# Patient Record
Sex: FEMALE | Race: WHITE | Employment: PART TIME | URBAN - METROPOLITAN AREA
[De-identification: names, ages, dates, MRNs, and addresses within clinical notes are randomized per-mention and may not be internally consistent; named-entity substitution may affect disease eponyms.]

---

## 2017-01-06 ENCOUNTER — OFFICE VISIT (OUTPATIENT)
Dept: OPTOMETRY | Facility: CLINIC | Age: 22
End: 2017-01-06

## 2017-01-06 DIAGNOSIS — H52.203 ASTIGMATISM, BILATERAL: Primary | ICD-10-CM

## 2017-01-06 PROCEDURE — 92012 INTRM OPH EXAM EST PATIENT: CPT | Performed by: OPTOMETRIST

## 2017-01-06 PROCEDURE — 92015 DETERMINE REFRACTIVE STATE: CPT | Performed by: OPTOMETRIST

## 2017-01-06 NOTE — PROGRESS NOTES
Marcos Trinidad is a 22year old female. HPI:     HPI     Patient is in for an annual eye exam. Last EE was 3 years ago. Patient states that eyes hurt when she is tired. She uses glasses for mainly driving.        Last edited by Ever Vogt, SHAHAB on 1/6 OD on 1/6/2017  1:09 PM. (History)          PHYSICAL EXAM:     Base Eye Exam     Visual Acuity (Snellen - Linear)      Right Left   Dist sc 20/30 20/25   Near sc 4pt 4pt         Tonometry (Non-contact air puff, 1:16 PM)      Right Left   Pressure 20 17

## 2017-02-28 NOTE — TELEPHONE ENCOUNTER
To Dr. Sabrina Jones - OK to refill? Spoke with pt who states Citalopram \"is helping a lot\" and would like refill.   (Per 12/23/16 office note pt was to call back in 3-4 weeks with update re: new medication.)

## 2017-04-03 ENCOUNTER — TELEPHONE (OUTPATIENT)
Dept: INTERNAL MEDICINE CLINIC | Facility: CLINIC | Age: 22
End: 2017-04-03

## 2017-04-03 NOTE — TELEPHONE ENCOUNTER
Called mother. Dr. Silvino christian reviewed lab results and at this point we are waiting for results of mumps testing. Dr. Silvino christian advised pt see ENT if she is doing better with abx and warm compresses. Relayed MD's message to mother---verbalized understanding.  Madalyn rico

## 2017-04-03 NOTE — TELEPHONE ENCOUNTER
Mother dropped off lab results from when pt was in ER at [facility] on 4/1/17. Pt was seen for parotid swelling. There is a mumps outbreak at [facility]. Mumps PCR was done but they do not have results yet.  Pt was given Keflex 500mg Q

## 2017-04-03 NOTE — TELEPHONE ENCOUNTER
Zbigniew Wilhelm is calling to have Dr. Elizabeth Mane review her daughter's test results she is still waiting for the mump results  She will drop them off today   Ph.  # [phone]     Routed to clinical

## 2017-04-06 NOTE — TELEPHONE ENCOUNTER
Patients mom called back to let Dr. Redd Rock know patients mumps test was negative. Patient is feeling better.  To Dr. Redd Rock as an Azeb Nap

## 2017-04-06 NOTE — TELEPHONE ENCOUNTER
Pt's mother Liz Mendenhall would like to speak to a nurse Re: test results.            Tasked to Nursing

## 2017-04-17 ENCOUNTER — TELEPHONE (OUTPATIENT)
Dept: INTERNAL MEDICINE CLINIC | Facility: CLINIC | Age: 22
End: 2017-04-17

## 2017-04-17 ENCOUNTER — TELEPHONE (OUTPATIENT)
Dept: OBGYN CLINIC | Facility: CLINIC | Age: 22
End: 2017-04-17

## 2017-04-17 RX ORDER — NORGESTIMATE AND ETHINYL ESTRADIOL 0.25-0.035
1 KIT ORAL DAILY
Qty: 1 PACKAGE | Refills: 8 | Status: SHIPPED | OUTPATIENT
Start: 2017-04-17 | End: 2018-01-08

## 2017-04-17 NOTE — TELEPHONE ENCOUNTER
Pt currently is on Ortho Cyclen 28. Pt wanting to know if she can switch to Sprintec 28 or Previfem, generic  ocps. Pt states that insurance will cover these birth control pills.

## 2017-04-17 NOTE — TELEPHONE ENCOUNTER
Pt would like to switch birth control so her insurance will cover it.   Would like to discuss with nurse what they will cover

## 2017-04-17 NOTE — TELEPHONE ENCOUNTER
Please advise - called patient who wants to switch birthcontrol. Does not have gyn. Sprintec 28 or Previum is covered  To DR. Thi Mcnally

## 2017-04-17 NOTE — TELEPHONE ENCOUNTER
LMTCB. RX SENT WITH REFILLS UNTIL DEC. 2017. SHOULD FINISH CURRENT PACK AND START NEW RX WHEN IT IS TIME TO START A NEW PACK.

## 2017-06-08 ENCOUNTER — OFFICE VISIT (OUTPATIENT)
Dept: OBGYN CLINIC | Facility: CLINIC | Age: 22
End: 2017-06-08

## 2017-06-08 VITALS
HEART RATE: 89 BPM | SYSTOLIC BLOOD PRESSURE: 112 MMHG | DIASTOLIC BLOOD PRESSURE: 76 MMHG | BODY MASS INDEX: 23 KG/M2 | WEIGHT: 123 LBS

## 2017-06-08 DIAGNOSIS — N89.8 VAGINAL IRRITATION: ICD-10-CM

## 2017-06-08 DIAGNOSIS — Z11.3 SCREENING FOR STD (SEXUALLY TRANSMITTED DISEASE): Primary | ICD-10-CM

## 2017-06-08 PROCEDURE — 99213 OFFICE O/P EST LOW 20 MIN: CPT | Performed by: CLINICAL NURSE SPECIALIST

## 2017-06-08 NOTE — PROGRESS NOTES
Dennis Mast is a 22year old female [facility] Patient's last menstrual period was 05/18/2017. Patient presents with:  Gyn Problem: Vaginal discharge and itcing  STD: STD Testing  Had some vaginal irritation last week but this has resolved.  Would like Protection: OCP, Condom    Comment: Last active in October     Other Topics Concern   None on file     Social History Narrative       MEDICATIONS:    Current outpatient prescriptions:   •  Norgestimate-Eth Estradiol ([facility] 28) 0.25-35 MG-MCG Oral Tab, T

## 2017-06-12 ENCOUNTER — TELEPHONE (OUTPATIENT)
Dept: OBGYN CLINIC | Facility: CLINIC | Age: 22
End: 2017-06-12

## 2017-06-12 NOTE — TELEPHONE ENCOUNTER
----- Message from JANA Keith sent at 6/12/2017 12:57 PM CDT -----  Please let pt know STD testing and vaginal culture are negative for infections.     MAF

## 2017-12-16 RX ORDER — NORGESTIMATE AND ETHINYL ESTRADIOL 0.25-0.035
KIT ORAL
Qty: 28 TABLET | Refills: 1 | OUTPATIENT
Start: 2017-12-16

## 2017-12-21 RX ORDER — NORGESTIMATE AND ETHINYL ESTRADIOL 0.25-0.035
KIT ORAL
Qty: 28 TABLET | Refills: 1 | OUTPATIENT
Start: 2017-12-21

## 2017-12-23 DIAGNOSIS — F41.9 ANXIETY: ICD-10-CM

## 2017-12-23 RX ORDER — CITALOPRAM HYDROBROMIDE 10 MG/1
10 TABLET ORAL DAILY
Qty: 90 TABLET | Refills: 1 | Status: CANCELLED | OUTPATIENT
Start: 2017-12-23

## 2018-01-08 ENCOUNTER — OFFICE VISIT (OUTPATIENT)
Dept: INTERNAL MEDICINE CLINIC | Facility: CLINIC | Age: 23
End: 2018-01-08

## 2018-01-08 VITALS
TEMPERATURE: 99 F | SYSTOLIC BLOOD PRESSURE: 96 MMHG | HEART RATE: 72 BPM | HEIGHT: 61 IN | BODY MASS INDEX: 23.98 KG/M2 | DIASTOLIC BLOOD PRESSURE: 68 MMHG | WEIGHT: 127 LBS

## 2018-01-08 DIAGNOSIS — Z11.3 SCREEN FOR STD (SEXUALLY TRANSMITTED DISEASE): ICD-10-CM

## 2018-01-08 DIAGNOSIS — Z30.9 ENCOUNTER FOR CONTRACEPTIVE MANAGEMENT, UNSPECIFIED TYPE: ICD-10-CM

## 2018-01-08 DIAGNOSIS — F41.9 ANXIETY: ICD-10-CM

## 2018-01-08 DIAGNOSIS — Z00.00 ANNUAL PHYSICAL EXAM: Primary | ICD-10-CM

## 2018-01-08 DIAGNOSIS — Z12.4 SCREENING FOR CERVICAL CANCER: ICD-10-CM

## 2018-01-08 PROCEDURE — 99395 PREV VISIT EST AGE 18-39: CPT | Performed by: INTERNAL MEDICINE

## 2018-01-08 RX ORDER — CITALOPRAM 10 MG/1
10 TABLET ORAL DAILY
Qty: 90 TABLET | Refills: 3 | Status: SHIPPED | OUTPATIENT
Start: 2018-01-08 | End: 2019-02-13

## 2018-01-08 RX ORDER — NORGESTIMATE AND ETHINYL ESTRADIOL 0.25-0.035
1 KIT ORAL DAILY
Qty: 1 PACKAGE | Refills: 12 | Status: SHIPPED | OUTPATIENT
Start: 2018-01-08 | End: 2019-01-30

## 2018-01-08 NOTE — PROGRESS NOTES
Paco Brenner is a 25year old female. Patient presents with:  Physical      HPI:   Paco Brenner is a 25year old female who presents for a complete physical exam.       She denies any past medical or surgical history.  She denies any allergies to Smokeless tobacco: Never Used                      Alcohol use: Yes           0.0 oz/week     Comment: 1 beer, occasionally         REVIEW OF SYSTEMS:   GENERAL: feels well otherwise  SKIN: denies any unusual skin lesions  EYES:denies blurred vision or

## 2018-01-09 ENCOUNTER — TELEPHONE (OUTPATIENT)
Dept: INTERNAL MEDICINE CLINIC | Facility: CLINIC | Age: 23
End: 2018-01-09

## 2018-01-09 DIAGNOSIS — Z11.3 SCREEN FOR STD (SEXUALLY TRANSMITTED DISEASE): Primary | ICD-10-CM

## 2018-01-09 NOTE — TELEPHONE ENCOUNTER
Spoke with patient. She would like general STI testing added on to pap sample--requested chlamydia and gonorrhea by name. Spoke with Isai in lab who states they can add these on.  If okay with on-call doctor, nursing to place orders as usual and then c

## 2018-01-11 ENCOUNTER — TELEPHONE (OUTPATIENT)
Dept: INTERNAL MEDICINE CLINIC | Facility: CLINIC | Age: 23
End: 2018-01-11

## 2018-01-11 DIAGNOSIS — A74.9 CHLAMYDIA: Primary | ICD-10-CM

## 2018-01-11 LAB
C TRACH DNA SPEC QL NAA+PROBE: POSITIVE
N GONORRHOEA DNA SPEC QL NAA+PROBE: NEGATIVE

## 2018-01-11 RX ORDER — AZITHROMYCIN 500 MG/1
1000 TABLET, FILM COATED ORAL ONCE
Qty: 2 TABLET | Refills: 0 | Status: SHIPPED | OUTPATIENT
Start: 2018-01-11 | End: 2018-01-11

## 2018-01-11 NOTE — TELEPHONE ENCOUNTER
Notified patient of +chlamydia test. Given rx for azithromycin 1g x 1 dose. Her prior partner had tested positive for chlamydia, but she has not had any partners since. Discussed abstaining for about 1 week to ensure she has cleared.  She can also retest at

## 2018-08-13 ENCOUNTER — APPOINTMENT (OUTPATIENT)
Dept: LAB | Facility: HOSPITAL | Age: 23
End: 2018-08-13
Attending: INTERNAL MEDICINE
Payer: COMMERCIAL

## 2018-08-13 ENCOUNTER — OFFICE VISIT (OUTPATIENT)
Dept: INTERNAL MEDICINE CLINIC | Facility: CLINIC | Age: 23
End: 2018-08-13
Payer: COMMERCIAL

## 2018-08-13 VITALS
OXYGEN SATURATION: 98 % | DIASTOLIC BLOOD PRESSURE: 70 MMHG | TEMPERATURE: 99 F | BODY MASS INDEX: 23.98 KG/M2 | HEART RATE: 92 BPM | SYSTOLIC BLOOD PRESSURE: 115 MMHG | HEIGHT: 61 IN | WEIGHT: 127 LBS

## 2018-08-13 DIAGNOSIS — F41.9 ANXIETY: ICD-10-CM

## 2018-08-13 DIAGNOSIS — B35.4 TINEA CORPORIS: Primary | ICD-10-CM

## 2018-08-13 DIAGNOSIS — Z11.3 SCREENING EXAMINATION FOR STD (SEXUALLY TRANSMITTED DISEASE): ICD-10-CM

## 2018-08-13 PROCEDURE — 87591 N.GONORRHOEAE DNA AMP PROB: CPT

## 2018-08-13 PROCEDURE — 99212 OFFICE O/P EST SF 10 MIN: CPT | Performed by: INTERNAL MEDICINE

## 2018-08-13 PROCEDURE — 87491 CHLMYD TRACH DNA AMP PROBE: CPT

## 2018-08-13 PROCEDURE — 86780 TREPONEMA PALLIDUM: CPT

## 2018-08-13 PROCEDURE — 36415 COLL VENOUS BLD VENIPUNCTURE: CPT

## 2018-08-13 PROCEDURE — 87389 HIV-1 AG W/HIV-1&-2 AB AG IA: CPT

## 2018-08-13 PROCEDURE — 99214 OFFICE O/P EST MOD 30 MIN: CPT | Performed by: INTERNAL MEDICINE

## 2018-08-13 RX ORDER — CICLOPIROX 7.7 MG/G
1 GEL TOPICAL 2 TIMES DAILY
Qty: 100 G | Refills: 1 | Status: SHIPPED | OUTPATIENT
Start: 2018-08-13 | End: 2019-04-04

## 2018-08-13 NOTE — PROGRESS NOTES
Dennis Mast is a 26year old female. Patient presents with:  Derm Problem: Patient has rash under right armpit and chest. onset is 1 month and started to spread 2 days ago.       HPI:   Dennis Mast is a 26year old female who presents for:    R Smokeless tobacco: Never Used                      Alcohol use: Yes           0.0 oz/week     Comment: 1 beer, occasionally         REVIEW OF SYSTEMS:   GENERAL: feels well otherwise  SKIN: reports rash  : denies vaginal discharge,

## 2018-08-14 LAB
C TRACH DNA SPEC QL NAA+PROBE: NEGATIVE
HIV1+2 AB SERPL QL IA: NONREACTIVE
N GONORRHOEA DNA SPEC QL NAA+PROBE: NEGATIVE

## 2018-08-15 ENCOUNTER — TELEPHONE (OUTPATIENT)
Dept: INTERNAL MEDICINE CLINIC | Facility: CLINIC | Age: 23
End: 2018-08-15

## 2018-08-15 LAB — T PALLIDUM AB SER QL: NEGATIVE

## 2018-08-20 ENCOUNTER — TELEPHONE (OUTPATIENT)
Dept: INTERNAL MEDICINE CLINIC | Facility: CLINIC | Age: 23
End: 2018-08-20

## 2018-08-20 NOTE — TELEPHONE ENCOUNTER
Calling to see if Dr Baltazar Gillespie can assist with getting in sooner to see a dermatologist for rash (ointment did not help)  Earliest available appt with Dr Elise Olvera is Sept 10     Please call  to advise [phone]

## 2018-08-20 NOTE — TELEPHONE ENCOUNTER
Nursing-can we please try to get patient in with Dr. Elise Olvera sooner than 9/10/18. For now, would recommend continuing the ointment twice daily; this can take 2-4 weeks to clear up.

## 2018-08-20 NOTE — TELEPHONE ENCOUNTER
Called patient to get more information. She reports she has used the medication since Wednesday. She reports the rash has not spread but she thinks that a couple of the spots have gotten larger and she has noticed a couple new spots close to the others.

## 2018-08-24 ENCOUNTER — OFFICE VISIT (OUTPATIENT)
Dept: DERMATOLOGY CLINIC | Facility: CLINIC | Age: 23
End: 2018-08-24
Payer: COMMERCIAL

## 2018-08-24 DIAGNOSIS — D18.01 HEMANGIOMA OF SKIN AND SUBCUTANEOUS TISSUE: ICD-10-CM

## 2018-08-24 DIAGNOSIS — D23.9 BENIGN NEOPLASM OF SKIN, UNSPECIFIED LOCATION: ICD-10-CM

## 2018-08-24 DIAGNOSIS — L70.0 ACNE VULGARIS: ICD-10-CM

## 2018-08-24 DIAGNOSIS — L42 PITYRIASIS ROSEA: Primary | ICD-10-CM

## 2018-08-24 PROCEDURE — 99202 OFFICE O/P NEW SF 15 MIN: CPT | Performed by: DERMATOLOGY

## 2018-08-24 PROCEDURE — 99212 OFFICE O/P EST SF 10 MIN: CPT | Performed by: DERMATOLOGY

## 2018-08-24 RX ORDER — CLINDAMYCIN AND BENZOYL PEROXIDE 10; 50 MG/G; MG/G
1 GEL TOPICAL 2 TIMES DAILY
Qty: 50 G | Refills: 12 | Status: SHIPPED | OUTPATIENT
Start: 2018-08-24 | End: 2018-09-23

## 2018-08-24 RX ORDER — TRIAMCINOLONE ACETONIDE 5 MG/G
CREAM TOPICAL
Qty: 60 G | Refills: 1 | Status: SHIPPED | OUTPATIENT
Start: 2018-08-24 | End: 2019-04-04

## 2018-08-24 NOTE — PATIENT INSTRUCTIONS
Pityriasis Rosea  This is a harmless non-contagious rash. The exact cause is unknown. It is not an allergic reaction, and does not seem to be caused by a viral or fungal infection.  Although anyone can get it, it is most often seen in children and young a © 1395-1185 [address]. [address] [facility] [place] [address] [place]. All rights reserved. This information is not intended as a substitute for professional medical care. Always follow your healthcare professional's instructions.         Boni Melton · Corticosteroid cream or ointment. You can apply this medicine to the rash 2 to 3 times a day, for up to 3 weeks. · Calamine lotion. This is a pink, watery lotion that can help stop itching. · Antihistamine. This medicine can help reduce itching.  You ca

## 2018-09-03 NOTE — PROGRESS NOTES
Matthew Wilson is a 26year old female.     Patient presents with:  Rash:  Pt presents today with red scaly patches that started to  to R armpit x 1 month and the notcied new spots to  chest, and back  f/u with PCP and was given Ciclopirox  gel with 28) 0.25-35 MG-MCG Oral Tab Take 1 tablet by mouth daily. Disp: 1 Package Rfl: 12     Allergies:   No Known Allergies    Past Medical History:   Diagnosis Date   • Anxiety     medication   • Chlamydia 2015    treated at school per pt. History reviewed. different no unusual exposures. No changes in soaps, detergents, skin care products. No recent travel. No else at home itching. No recent illnesses. ROS:   Denies any other systemic complaints.   No fevers, chills, night sweats, photosensitivity, not improving  RTC as noted one month if not improving, or as needed    No orders of the defined types were placed in this encounter.       Meds & Refills for this Visit:   Signed Prescriptions Disp Refills    triamcinolone acetonide 0.5 % External Cream 60

## 2018-09-06 ENCOUNTER — TELEPHONE (OUTPATIENT)
Dept: DERMATOLOGY CLINIC | Facility: CLINIC | Age: 23
End: 2018-09-06

## 2018-09-06 RX ORDER — CLINDAMYCIN PHOSPHATE AND BENZOYL PEROXIDE 10; 50 MG/G; MG/G
GEL TOPICAL 2 TIMES DAILY
COMMUNITY
End: 2019-03-04

## 2018-09-06 RX ORDER — CLINDAMYCIN PHOSPHATE AND BENZOYL PEROXIDE 10; 50 MG/G; MG/G
1 GEL TOPICAL 2 TIMES DAILY
Qty: 45 G | Refills: 12 | Status: SHIPPED | OUTPATIENT
Start: 2018-09-06 | End: 2019-04-04

## 2018-09-06 NOTE — TELEPHONE ENCOUNTER
Signed the Duac to go to [facility] along with the. This might be on back order. 1 of the BPO medications(generic Epiduo versus the Duac generic) was on back order until November along with a 0.3% adapalene gel.

## 2018-09-20 ENCOUNTER — TELEPHONE (OUTPATIENT)
Dept: DERMATOLOGY CLINIC | Facility: CLINIC | Age: 23
End: 2018-09-20

## 2018-09-20 NOTE — TELEPHONE ENCOUNTER
LOV 8/25/18. Patient continues to use TAC BID to pityriasis rosea to right armpit, chest, and back. Per patient, redness is improving, but now some patches have a red/brown ring around outer edge. New patches appearing. Denies flaking or scaly.  New patches

## 2018-09-20 NOTE — TELEPHONE ENCOUNTER
The VA usually can take anywhere from a few weeks to a couple of months to resolve , so not alarming that there are new cecilia, the cream is predominantly for the itching.   I would give this another couple of weeks

## 2019-01-30 ENCOUNTER — TELEPHONE (OUTPATIENT)
Dept: INTERNAL MEDICINE CLINIC | Facility: CLINIC | Age: 24
End: 2019-01-30

## 2019-01-30 DIAGNOSIS — Z30.9 ENCOUNTER FOR CONTRACEPTIVE MANAGEMENT, UNSPECIFIED TYPE: ICD-10-CM

## 2019-02-04 DIAGNOSIS — Z30.9 ENCOUNTER FOR CONTRACEPTIVE MANAGEMENT, UNSPECIFIED TYPE: ICD-10-CM

## 2019-02-04 RX ORDER — NORGESTIMATE AND ETHINYL ESTRADIOL 0.25-0.035
KIT ORAL
Qty: 28 TABLET | Refills: 0 | Status: SHIPPED | OUTPATIENT
Start: 2019-02-04 | End: 2019-02-13

## 2019-02-04 NOTE — TELEPHONE ENCOUNTER
Pt called back and made an appointment for her annual OV on 2/13  Wondering if we can sent over a prescription to last her till then.   Pharmacy: [facility] on [address] in [facility] (right off of [facility])  Best call back is [phone]

## 2019-02-05 RX ORDER — NORGESTIMATE AND ETHINYL ESTRADIOL 0.25-0.035
KIT ORAL
Qty: 28 TABLET | Refills: 7 | OUTPATIENT
Start: 2019-02-05

## 2019-02-05 NOTE — TELEPHONE ENCOUNTER
Current refill request refused due to refill is either a duplicate request or has active refills at the pharmacy. Check previous templates.     Requested Prescriptions     Refused Prescriptions Disp Refills   • [address] 0.25-35 MG-MCG Oral Tab [Pharmacy

## 2019-02-13 ENCOUNTER — OFFICE VISIT (OUTPATIENT)
Dept: INTERNAL MEDICINE CLINIC | Facility: CLINIC | Age: 24
End: 2019-02-13
Payer: COMMERCIAL

## 2019-02-13 VITALS
HEIGHT: 61 IN | OXYGEN SATURATION: 99 % | DIASTOLIC BLOOD PRESSURE: 80 MMHG | WEIGHT: 121.81 LBS | TEMPERATURE: 98 F | HEART RATE: 96 BPM | BODY MASS INDEX: 23 KG/M2 | RESPIRATION RATE: 18 BRPM | SYSTOLIC BLOOD PRESSURE: 122 MMHG

## 2019-02-13 DIAGNOSIS — F41.9 ANXIETY: ICD-10-CM

## 2019-02-13 DIAGNOSIS — Z00.00 ANNUAL PHYSICAL EXAM: Primary | ICD-10-CM

## 2019-02-13 DIAGNOSIS — Z30.9 ENCOUNTER FOR CONTRACEPTIVE MANAGEMENT, UNSPECIFIED TYPE: ICD-10-CM

## 2019-02-13 PROCEDURE — 99395 PREV VISIT EST AGE 18-39: CPT | Performed by: INTERNAL MEDICINE

## 2019-02-13 RX ORDER — NORGESTIMATE AND ETHINYL ESTRADIOL 0.25-0.035
1 KIT ORAL
Qty: 3 PACKAGE | Refills: 3 | Status: SHIPPED | OUTPATIENT
Start: 2019-02-13 | End: 2020-01-27

## 2019-02-13 NOTE — PROGRESS NOTES
Bibiana Ch is a 26year old female. Patient presents with:  Physical: Pt presents for routine physical, last pap done 1/8/18 next due on 1/8/21.  per pt doing well w/ no complaints/concerns      HPI:   Bibiana Ch is a 26year old female who p • Diabetes Paternal Grandfather    • High Cholesterol Paternal Grandfather         elevated cholesterol   • High Cholesterol Maternal Grandfather         elevated cholesterol   • Cancer Paternal Grandmother         ovarian cancer?    • Heart Attack Neg radial and DP pulses bilaterally  NEURO: A&O x 3, moves all 4 extremities spontaneously      ASSESSMENT AND PLAN:     1. Anxiety  Previously took celexa 10mg; now off meds and doing well. 2. Annual physical  Pap done 1/2018  Will need tdap 2020.    Disc

## 2019-03-02 DIAGNOSIS — Z30.9 ENCOUNTER FOR CONTRACEPTIVE MANAGEMENT, UNSPECIFIED TYPE: ICD-10-CM

## 2019-03-04 NOTE — TELEPHONE ENCOUNTER
Current refill request refused due to refill is either a duplicate request or has active refills at the pharmacy. Check previous templates.     Requested Prescriptions     Refused Prescriptions Disp Refills   • [address] [address] 0.25-35 MG-MCG Oral Tab [Pharmacy

## 2019-04-04 ENCOUNTER — APPOINTMENT (OUTPATIENT)
Dept: LAB | Age: 24
End: 2019-04-04
Attending: INTERNAL MEDICINE
Payer: COMMERCIAL

## 2019-04-04 ENCOUNTER — OFFICE VISIT (OUTPATIENT)
Dept: INTERNAL MEDICINE CLINIC | Facility: CLINIC | Age: 24
End: 2019-04-04
Payer: COMMERCIAL

## 2019-04-04 VITALS
WEIGHT: 121 LBS | HEART RATE: 100 BPM | DIASTOLIC BLOOD PRESSURE: 70 MMHG | HEIGHT: 61 IN | TEMPERATURE: 99 F | SYSTOLIC BLOOD PRESSURE: 110 MMHG | BODY MASS INDEX: 22.84 KG/M2

## 2019-04-04 DIAGNOSIS — Z11.3 SCREENING EXAMINATION FOR STD (SEXUALLY TRANSMITTED DISEASE): ICD-10-CM

## 2019-04-04 DIAGNOSIS — B96.89 BACTERIAL VAGINOSIS: ICD-10-CM

## 2019-04-04 DIAGNOSIS — N76.0 BACTERIAL VAGINOSIS: ICD-10-CM

## 2019-04-04 DIAGNOSIS — Z11.3 SCREENING EXAMINATION FOR STD (SEXUALLY TRANSMITTED DISEASE): Primary | ICD-10-CM

## 2019-04-04 PROCEDURE — 87491 CHLMYD TRACH DNA AMP PROBE: CPT

## 2019-04-04 PROCEDURE — 99212 OFFICE O/P EST SF 10 MIN: CPT | Performed by: INTERNAL MEDICINE

## 2019-04-04 PROCEDURE — 99213 OFFICE O/P EST LOW 20 MIN: CPT | Performed by: INTERNAL MEDICINE

## 2019-04-04 PROCEDURE — 87591 N.GONORRHOEAE DNA AMP PROB: CPT

## 2019-04-04 RX ORDER — METRONIDAZOLE 500 MG/1
500 TABLET ORAL 3 TIMES DAILY
Qty: 14 TABLET | Refills: 0 | Status: SHIPPED | OUTPATIENT
Start: 2019-04-04 | End: 2019-10-07

## 2019-04-04 NOTE — PROGRESS NOTES
Marcos Trinidad is a 26year old female. Patient presents with:  Checkup: yeast infection      HPI:   Marcos Trinidad is a 26year old female who presents for: vaginal discharge    Reports that last week, she had white cake-y discharge.  Then started h GENERAL: well developed, well nourished, in no apparent distress  : normal pelvic exam without significant discharge, no CMT.        ASSESSMENT AND PLAN:     1. Screening examination for STD (sexually transmitted disease)  Check gc/chlamydia  - CHLAMY

## 2019-04-05 ENCOUNTER — TELEPHONE (OUTPATIENT)
Dept: INTERNAL MEDICINE CLINIC | Facility: CLINIC | Age: 24
End: 2019-04-05

## 2019-10-07 NOTE — PROGRESS NOTES
Glenn Jimenez is a 22year old female. Patient presents with:  Medication Request: Pt would like to discuss medications       HPI:   Glenn Jimenez is a 22year old female who presents for: anxiety    She would like to discuss going on medication f otherwise  reports anxiety; difficulty focusing      EXAM:   /72   Pulse 91   Temp 98.4 °F (36.9 °C) (Oral)   Ht 5' 1\" (1.549 m)   Wt 121 lb (54.9 kg)   LMP 09/08/2019   SpO2 98%   BMI 22.86 kg/m²     GENERAL: well developed, well nourished, in no a

## 2019-10-23 ENCOUNTER — PATIENT MESSAGE (OUTPATIENT)
Dept: INTERNAL MEDICINE CLINIC | Facility: CLINIC | Age: 24
End: 2019-10-23

## 2019-10-23 DIAGNOSIS — F90.9 ATTENTION DEFICIT HYPERACTIVITY DISORDER (ADHD), UNSPECIFIED ADHD TYPE: ICD-10-CM

## 2019-10-23 DIAGNOSIS — F41.9 ANXIETY: ICD-10-CM

## 2019-10-23 RX ORDER — BUPROPION HYDROCHLORIDE 150 MG/1
150 TABLET ORAL DAILY
Qty: 90 TABLET | Refills: 3 | Status: SHIPPED | OUTPATIENT
Start: 2019-10-23 | End: 2021-01-21

## 2019-10-23 NOTE — TELEPHONE ENCOUNTER
From: Rosio Lee  To: Julia Coleman DO  Sent: 10/23/2019 3:42 PM CDT  Subject: Other    Hi Dr. Homa Butler,    I just wanted to check in since I’ve been using my medicine the past couple weeks. I like it so far! If anything changes I will let you know.

## 2019-12-18 ENCOUNTER — PATIENT MESSAGE (OUTPATIENT)
Dept: INTERNAL MEDICINE CLINIC | Facility: CLINIC | Age: 24
End: 2019-12-18

## 2019-12-18 ENCOUNTER — OFFICE VISIT (OUTPATIENT)
Dept: INTERNAL MEDICINE CLINIC | Facility: CLINIC | Age: 24
End: 2019-12-18
Payer: COMMERCIAL

## 2019-12-18 ENCOUNTER — APPOINTMENT (OUTPATIENT)
Dept: LAB | Age: 24
End: 2019-12-18
Attending: INTERNAL MEDICINE
Payer: COMMERCIAL

## 2019-12-18 VITALS
OXYGEN SATURATION: 98 % | HEART RATE: 77 BPM | BODY MASS INDEX: 23 KG/M2 | RESPIRATION RATE: 12 BRPM | TEMPERATURE: 98 F | WEIGHT: 120 LBS | SYSTOLIC BLOOD PRESSURE: 118 MMHG | DIASTOLIC BLOOD PRESSURE: 78 MMHG

## 2019-12-18 DIAGNOSIS — B96.89 BACTERIAL VAGINOSIS: ICD-10-CM

## 2019-12-18 DIAGNOSIS — Z11.3 SCREENING EXAMINATION FOR STD (SEXUALLY TRANSMITTED DISEASE): Primary | ICD-10-CM

## 2019-12-18 DIAGNOSIS — N76.0 BACTERIAL VAGINOSIS: ICD-10-CM

## 2019-12-18 PROCEDURE — 87389 HIV-1 AG W/HIV-1&-2 AB AG IA: CPT | Performed by: INTERNAL MEDICINE

## 2019-12-18 PROCEDURE — 87591 N.GONORRHOEAE DNA AMP PROB: CPT | Performed by: INTERNAL MEDICINE

## 2019-12-18 PROCEDURE — 99213 OFFICE O/P EST LOW 20 MIN: CPT | Performed by: INTERNAL MEDICINE

## 2019-12-18 PROCEDURE — 99212 OFFICE O/P EST SF 10 MIN: CPT | Performed by: INTERNAL MEDICINE

## 2019-12-18 PROCEDURE — 86780 TREPONEMA PALLIDUM: CPT | Performed by: INTERNAL MEDICINE

## 2019-12-18 PROCEDURE — 87491 CHLMYD TRACH DNA AMP PROBE: CPT | Performed by: INTERNAL MEDICINE

## 2019-12-18 PROCEDURE — 36415 COLL VENOUS BLD VENIPUNCTURE: CPT | Performed by: INTERNAL MEDICINE

## 2019-12-18 RX ORDER — METRONIDAZOLE 500 MG/1
500 TABLET ORAL 2 TIMES DAILY
Qty: 14 TABLET | Refills: 0 | Status: SHIPPED | OUTPATIENT
Start: 2019-12-18 | End: 2020-02-17 | Stop reason: ALTCHOICE

## 2019-12-18 NOTE — PROGRESS NOTES
Gwyn Martinez is a 22year old female. Patient presents with:  Lab: Pt presents for STI testing. She has a recent new partner and would like updated testing. Reports no current symptoms.        HPI:   Gwyn Martinez is a 22year old female who prese beer, occasionally    Drug use: No         REVIEW OF SYSTEMS:   GENERAL: feels well otherwise  : denies dyspareunia, urinary frequency, dysuria. +vaginal discharge      EXAM:   /78 (BP Location: Right arm, Patient Position: Sitting)   Pulse 77   Te

## 2020-01-06 ENCOUNTER — TELEPHONE (OUTPATIENT)
Dept: INTERNAL MEDICINE CLINIC | Facility: CLINIC | Age: 25
End: 2020-01-06

## 2020-01-06 NOTE — TELEPHONE ENCOUNTER
I spoke to patient. She states she just left work. \"I kept feeling like I was going to throw up\"  She states for the past few days she has had a mucusy cough. Yesterday she had a headache and her face felt tight, like a sinus infection.   She took her t

## 2020-01-07 NOTE — TELEPHONE ENCOUNTER
To Dr. Lex Lynn - see below - spoke with pt who is resting at home, treating flu sx as prescribed at [facility] record in your In Box.

## 2020-01-07 NOTE — TELEPHONE ENCOUNTER
Received a fax from [facility] in [facility]. Results placed in Dr Terrazas.

## 2020-01-25 DIAGNOSIS — Z30.9 ENCOUNTER FOR CONTRACEPTIVE MANAGEMENT, UNSPECIFIED TYPE: ICD-10-CM

## 2020-01-27 NOTE — TELEPHONE ENCOUNTER
Refill request received for Sprintex. Last PE 2/13/19 and no appt scheduled at this time. To  please call pt to schedule annual PE and then route back to Rx.

## 2020-02-17 ENCOUNTER — OFFICE VISIT (OUTPATIENT)
Dept: INTERNAL MEDICINE CLINIC | Facility: CLINIC | Age: 25
End: 2020-02-17
Payer: COMMERCIAL

## 2020-02-17 VITALS
OXYGEN SATURATION: 100 % | WEIGHT: 120 LBS | DIASTOLIC BLOOD PRESSURE: 86 MMHG | TEMPERATURE: 98 F | HEIGHT: 61 IN | HEART RATE: 77 BPM | SYSTOLIC BLOOD PRESSURE: 112 MMHG | BODY MASS INDEX: 22.66 KG/M2

## 2020-02-17 DIAGNOSIS — F90.9 ATTENTION DEFICIT HYPERACTIVITY DISORDER (ADHD), UNSPECIFIED ADHD TYPE: ICD-10-CM

## 2020-02-17 DIAGNOSIS — F41.9 ANXIETY: ICD-10-CM

## 2020-02-17 DIAGNOSIS — Z00.00 ANNUAL PHYSICAL EXAM: Primary | ICD-10-CM

## 2020-02-17 PROCEDURE — 90715 TDAP VACCINE 7 YRS/> IM: CPT | Performed by: INTERNAL MEDICINE

## 2020-02-17 PROCEDURE — 90471 IMMUNIZATION ADMIN: CPT | Performed by: INTERNAL MEDICINE

## 2020-02-17 PROCEDURE — 99395 PREV VISIT EST AGE 18-39: CPT | Performed by: INTERNAL MEDICINE

## 2020-02-17 NOTE — PROGRESS NOTES
Earlean Bamberger is a 22year old female. Patient presents with:  Physical: Last pap January 2018. Declining flu shot today.        HPI:   Earlean Bamberger is a 22year old female who presents for a complete physical exam.     PMH: anxiety  Positive chla Cannabis      Comment: daily use         REVIEW OF SYSTEMS:   GENERAL: feels well otherwise  SKIN: denies any unusual skin lesions  EYES:denies blurred vision or double vision  HEENT: denies nasal congestion, sinus pain, ST, sore throat  LUNGS: denies shor

## 2020-02-18 ENCOUNTER — PATIENT MESSAGE (OUTPATIENT)
Dept: INTERNAL MEDICINE CLINIC | Facility: CLINIC | Age: 25
End: 2020-02-18

## 2020-02-19 RX ORDER — METHYLPHENIDATE HYDROCHLORIDE 18 MG/1
18 TABLET ORAL EVERY MORNING
Qty: 30 TABLET | Refills: 0 | Status: SHIPPED | OUTPATIENT
Start: 2020-02-19 | End: 2020-03-10

## 2020-02-19 NOTE — TELEPHONE ENCOUNTER
From: Javy Curtis  To: Juan Manuel Espinal DO  Sent: 2/18/2020 6:16 PM CST  Subject: Prescription Question    Hi Dr Isaiah Marie,    Do I need to drop off the request for my concerta? The [facility] says that they never received the prescription request from you.  June Segura

## 2020-02-24 ENCOUNTER — PATIENT MESSAGE (OUTPATIENT)
Dept: INTERNAL MEDICINE CLINIC | Facility: CLINIC | Age: 25
End: 2020-02-24

## 2020-02-24 NOTE — TELEPHONE ENCOUNTER
From: Achilles Kirsten  To: Juan Manuel Espinal DO  Sent: 2/24/2020 9:31 AM CST  Subject: Prescription Question    Hi Dr. Isaiah Marie,    Just wanted to touch base about my prescription, thanks!     Chava Strange

## 2020-03-09 ENCOUNTER — PATIENT MESSAGE (OUTPATIENT)
Dept: INTERNAL MEDICINE CLINIC | Facility: CLINIC | Age: 25
End: 2020-03-09

## 2020-03-09 NOTE — TELEPHONE ENCOUNTER
From: Felipa Johnson  To: Fany Castillo DO  Sent: 3/9/2020 8:12 AM CDT  Subject: Other    Good Morning Dr. Patricia Rico,    I just wanted to update you on the concerta - I do like it and it does not make me jittery like typical adderall which I like.  I was going

## 2020-03-10 RX ORDER — METHYLPHENIDATE HYDROCHLORIDE 27 MG/1
27 TABLET ORAL EVERY MORNING
Qty: 30 TABLET | Refills: 0 | Status: SHIPPED | OUTPATIENT
Start: 2020-03-10 | End: 2020-04-09

## 2020-04-13 DIAGNOSIS — F41.9 ANXIETY: ICD-10-CM

## 2020-04-13 DIAGNOSIS — Z30.9 ENCOUNTER FOR CONTRACEPTIVE MANAGEMENT, UNSPECIFIED TYPE: ICD-10-CM

## 2020-04-13 DIAGNOSIS — F90.9 ATTENTION DEFICIT HYPERACTIVITY DISORDER (ADHD), UNSPECIFIED ADHD TYPE: ICD-10-CM

## 2020-04-14 ENCOUNTER — PATIENT MESSAGE (OUTPATIENT)
Dept: INTERNAL MEDICINE CLINIC | Facility: CLINIC | Age: 25
End: 2020-04-14

## 2020-04-14 RX ORDER — BUPROPION HYDROCHLORIDE 150 MG/1
150 TABLET ORAL DAILY
Qty: 90 TABLET | Refills: 3 | OUTPATIENT
Start: 2020-04-14

## 2020-04-14 RX ORDER — NORGESTIMATE AND ETHINYL ESTRADIOL 0.25-0.035
1 KIT ORAL DAILY
Qty: 3 PACKAGE | Refills: 3 | Status: SHIPPED | OUTPATIENT
Start: 2020-04-14 | End: 2020-08-13

## 2020-04-15 DIAGNOSIS — Z30.9 ENCOUNTER FOR CONTRACEPTIVE MANAGEMENT, UNSPECIFIED TYPE: ICD-10-CM

## 2020-04-15 NOTE — TELEPHONE ENCOUNTER
Current refill request refused due to refill is either a duplicate request or has active refills at the pharmacy. Check previous templates.     Requested Prescriptions     Refused Prescriptions Disp Refills   • [address] [address] 0.25-35 MG-MCG Oral Tab [Pharmacy

## 2020-05-05 ENCOUNTER — PATIENT MESSAGE (OUTPATIENT)
Dept: INTERNAL MEDICINE CLINIC | Facility: CLINIC | Age: 25
End: 2020-05-05

## 2020-05-05 NOTE — TELEPHONE ENCOUNTER
No record of Concerta on med list, nor is there a record that Dr. Bacilio Michelle has prescribed.  Myccrt sent to patient

## 2020-05-05 NOTE — TELEPHONE ENCOUNTER
From: Garo Mcconnell  To: Ingrid Duncan,   Sent: 5/5/2020 10:34 AM CDT  Subject: Prescription Question    Hi Dr Kwasi Lee,    Could I get a refill for the Concerta, please? Thank you!     Mireille Medina

## 2020-05-06 RX ORDER — METHYLPHENIDATE HYDROCHLORIDE 27 MG/1
27 TABLET ORAL DAILY
Qty: 30 TABLET | Refills: 0 | Status: SHIPPED | OUTPATIENT
Start: 2020-07-07 | End: 2020-08-06

## 2020-05-06 RX ORDER — METHYLPHENIDATE HYDROCHLORIDE 27 MG/1
27 TABLET ORAL DAILY
Qty: 30 TABLET | Refills: 0 | Status: SHIPPED | OUTPATIENT
Start: 2020-05-06 | End: 2020-06-05

## 2020-05-06 RX ORDER — METHYLPHENIDATE HYDROCHLORIDE 27 MG/1
27 TABLET ORAL DAILY
Qty: 30 TABLET | Refills: 0 | Status: SHIPPED | OUTPATIENT
Start: 2020-06-06 | End: 2020-07-06

## 2020-05-13 ENCOUNTER — PATIENT MESSAGE (OUTPATIENT)
Dept: INTERNAL MEDICINE CLINIC | Facility: CLINIC | Age: 25
End: 2020-05-13

## 2020-05-13 RX ORDER — METHYLPHENIDATE HYDROCHLORIDE 27 MG/1
27 TABLET ORAL EVERY MORNING
Qty: 30 TABLET | Refills: 0 | Status: SHIPPED | OUTPATIENT
Start: 2020-05-13 | End: 2020-06-12

## 2020-05-13 NOTE — TELEPHONE ENCOUNTER
From: Estrellita Prior  To: Daniel Cam DO  Sent: 5/13/2020 9:43 AM CDT  Subject: Prescription Question    Hi Dr. Philip Rogers,    The prescription is back ordered at [facility] and they said to try send to [facility] to see if they had stock because they don’t know how

## 2020-05-13 NOTE — TELEPHONE ENCOUNTER
From: Unknown   To: Angie Low DO  Sent: 5/13/2020 9:52 AM CDT  Subject: Prescription Question    I don't know why I couldn't reply directly to that message - but the prescription is Concerta 07TQ ER Tablets.  [facility] said they are

## 2020-08-13 ENCOUNTER — PATIENT MESSAGE (OUTPATIENT)
Dept: INTERNAL MEDICINE CLINIC | Facility: CLINIC | Age: 25
End: 2020-08-13

## 2020-08-13 DIAGNOSIS — F90.9 ATTENTION DEFICIT HYPERACTIVITY DISORDER (ADHD), UNSPECIFIED ADHD TYPE: ICD-10-CM

## 2020-08-13 DIAGNOSIS — F41.9 ANXIETY: ICD-10-CM

## 2020-08-13 DIAGNOSIS — Z30.9 ENCOUNTER FOR CONTRACEPTIVE MANAGEMENT, UNSPECIFIED TYPE: ICD-10-CM

## 2020-08-13 RX ORDER — NORGESTIMATE AND ETHINYL ESTRADIOL 0.25-0.035
1 KIT ORAL DAILY
Qty: 3 PACKAGE | Refills: 3 | Status: SHIPPED | OUTPATIENT
Start: 2020-08-13 | End: 2021-07-12

## 2020-08-13 RX ORDER — METHYLPHENIDATE HYDROCHLORIDE 27 MG/1
27 TABLET ORAL DAILY
Qty: 30 TABLET | Refills: 0 | Status: SHIPPED | OUTPATIENT
Start: 2020-10-14 | End: 2020-11-13

## 2020-08-13 RX ORDER — METHYLPHENIDATE HYDROCHLORIDE 27 MG/1
27 TABLET ORAL DAILY
Qty: 30 TABLET | Refills: 0 | Status: SHIPPED | OUTPATIENT
Start: 2020-08-13 | End: 2020-09-12

## 2020-08-13 RX ORDER — METHYLPHENIDATE HYDROCHLORIDE 27 MG/1
27 TABLET ORAL DAILY
Qty: 30 TABLET | Refills: 0 | Status: SHIPPED | OUTPATIENT
Start: 2020-09-13 | End: 2020-10-13

## 2020-08-13 NOTE — TELEPHONE ENCOUNTER
The following prescription was reviewed, authorized, and electronically sent to the pharmacy.     Requested Prescriptions     Signed Prescriptions Disp Refills   • Norgestimate-Eth Estradiol (SPRINTEC 28) 0.25-35 MG-MCG Oral Tab 3 Package 3     Sig: Take 1

## 2020-08-13 NOTE — TELEPHONE ENCOUNTER
From: Mark Hall  To: Antoinette Jaime DO  Sent: 8/13/2020 8:05 AM CDT  Subject: Prescription Question    Dr Billy Valencia,    Can you send my birth control and concerta refill to the [facility] on [address] in [place]? My pharmacy is closed due to [address] [facility] [address]

## 2020-08-13 NOTE — TELEPHONE ENCOUNTER
Concerta last filled as a 90 day panel 5/6-7/7. RX's pended. To Dr. Rojas Neither in Dr. Gerardo Lazar absence.

## 2020-09-12 DIAGNOSIS — Z30.9 ENCOUNTER FOR CONTRACEPTIVE MANAGEMENT, UNSPECIFIED TYPE: ICD-10-CM

## 2020-09-14 RX ORDER — NORGESTIMATE AND ETHINYL ESTRADIOL 0.25-0.035
1 KIT ORAL DAILY
Qty: 3 PACKAGE | Refills: 3 | OUTPATIENT
Start: 2020-09-14

## 2020-09-14 NOTE — TELEPHONE ENCOUNTER
Current refill request refused due to refill is either a duplicate request or has active refills at the pharmacy. Check previous templates.     Requested Prescriptions     Refused Prescriptions Disp Refills   • Norgestimate-Eth Estradiol (SPRINTEC 28) 0.25

## 2020-11-01 ENCOUNTER — PATIENT MESSAGE (OUTPATIENT)
Dept: INTERNAL MEDICINE CLINIC | Facility: CLINIC | Age: 25
End: 2020-11-01

## 2020-11-02 ENCOUNTER — TELEPHONE (OUTPATIENT)
Dept: INTERNAL MEDICINE CLINIC | Facility: CLINIC | Age: 25
End: 2020-11-02

## 2020-11-02 NOTE — TELEPHONE ENCOUNTER
Blood typing is not a routine order and would have to be ordered separately. Not sure that this would be covered by insurance as a routine test.  No particular value in this test for any predictive ability.

## 2020-11-02 NOTE — TELEPHONE ENCOUNTER
To Dr. Joseline Shultz---    Received mychart with the following:  \"  Hello! I was just wondering if you could tell me my blood type. Thank you! \"    No records of type and screen on file. OK to advise pt?

## 2020-11-02 NOTE — TELEPHONE ENCOUNTER
From: Paco Brenner  To: Leisa Veras DO  Sent: 11/1/2020 8:13 PM CST  Subject: Non-Urgent Medical Question    Hello! I was just wondering if you could tell me my blood type. Thank you!

## 2020-11-06 NOTE — TELEPHONE ENCOUNTER
[facility] [place] response sent to patient since unable to reach Danny Louis via phone. Signing Encounter. Belgica German reviewed your message in Dr. Sid crook; He states, \"Blood typing is not a routine order and would have to be ordered separately.

## 2020-11-30 NOTE — TELEPHONE ENCOUNTER
From: Beatriz Maldonado  To: Felipa Yates DO  Sent: 4/14/2020 9:42 AM CDT  Subject: Prescription Question    Hi Dr Quinones Jurist! Hope you are doing okay in this madness, ha.     I just wanted to confirm both my sprintec and Wellbutrin are being refilled?  I got Quality 111:Pneumonia Vaccination Status For Older Adults: Pneumococcal Vaccination Previously Received Quality 130: Documentation Of Current Medications In The Medical Record: Current Medications Documented Detail Level: Simple

## 2021-01-21 ENCOUNTER — OFFICE VISIT (OUTPATIENT)
Dept: INTERNAL MEDICINE CLINIC | Facility: CLINIC | Age: 26
End: 2021-01-21
Payer: COMMERCIAL

## 2021-01-21 VITALS
HEART RATE: 105 BPM | DIASTOLIC BLOOD PRESSURE: 70 MMHG | TEMPERATURE: 98 F | WEIGHT: 124.19 LBS | SYSTOLIC BLOOD PRESSURE: 108 MMHG | HEIGHT: 61 IN | OXYGEN SATURATION: 99 % | BODY MASS INDEX: 23.45 KG/M2

## 2021-01-21 DIAGNOSIS — Z11.3 SCREEN FOR STD (SEXUALLY TRANSMITTED DISEASE): ICD-10-CM

## 2021-01-21 DIAGNOSIS — Z32.00 ENCOUNTER FOR PREGNANCY TEST, RESULT UNKNOWN: ICD-10-CM

## 2021-01-21 DIAGNOSIS — F90.0 ATTENTION DEFICIT HYPERACTIVITY DISORDER (ADHD), PREDOMINANTLY INATTENTIVE TYPE: ICD-10-CM

## 2021-01-21 DIAGNOSIS — R53.83 OTHER FATIGUE: ICD-10-CM

## 2021-01-21 DIAGNOSIS — Z00.00 ROUTINE HEALTH MAINTENANCE: Primary | ICD-10-CM

## 2021-01-21 DIAGNOSIS — Z12.4 CERVICAL CANCER SCREENING: ICD-10-CM

## 2021-01-21 PROCEDURE — 3078F DIAST BP <80 MM HG: CPT | Performed by: INTERNAL MEDICINE

## 2021-01-21 PROCEDURE — 90686 IIV4 VACC NO PRSV 0.5 ML IM: CPT | Performed by: INTERNAL MEDICINE

## 2021-01-21 PROCEDURE — 3008F BODY MASS INDEX DOCD: CPT | Performed by: INTERNAL MEDICINE

## 2021-01-21 PROCEDURE — 3074F SYST BP LT 130 MM HG: CPT | Performed by: INTERNAL MEDICINE

## 2021-01-21 PROCEDURE — 90471 IMMUNIZATION ADMIN: CPT | Performed by: INTERNAL MEDICINE

## 2021-01-21 PROCEDURE — 99395 PREV VISIT EST AGE 18-39: CPT | Performed by: INTERNAL MEDICINE

## 2021-01-21 RX ORDER — METHYLPHENIDATE HYDROCHLORIDE 27 MG/1
27 TABLET ORAL EVERY MORNING
COMMUNITY
End: 2021-02-17

## 2021-01-21 RX ORDER — METHYLPHENIDATE HYDROCHLORIDE 27 MG/1
27 TABLET ORAL EVERY MORNING
Refills: 0 | Status: CANCELLED | OUTPATIENT
Start: 2021-01-21

## 2021-01-21 RX ORDER — METHYLPHENIDATE HYDROCHLORIDE 27 MG/1
27 TABLET ORAL DAILY
Qty: 30 TABLET | Refills: 0 | Status: SHIPPED | OUTPATIENT
Start: 2021-01-21 | End: 2021-02-17

## 2021-01-21 RX ORDER — METHYLPHENIDATE HYDROCHLORIDE 27 MG/1
27 TABLET ORAL DAILY
Qty: 30 TABLET | Refills: 0 | Status: SHIPPED | OUTPATIENT
Start: 2021-02-21 | End: 2021-02-17

## 2021-01-21 RX ORDER — METHYLPHENIDATE HYDROCHLORIDE 27 MG/1
27 TABLET ORAL DAILY
Qty: 30 TABLET | Refills: 0 | Status: SHIPPED | OUTPATIENT
Start: 2021-03-24 | End: 2021-04-23

## 2021-01-22 NOTE — PROGRESS NOTES
Jerry Mendoza is a 26year old female. Patient presents with:  Physical: Would like pap smear done. No concerns at this time.   Medication Request: Needs refill of concerta, pended      HPI:   Jerry Mendoza is a 26year old female who presents for paternal grandfather, maternal grandfather   • Glaucoma Neg       Social History:   Social History    Tobacco Use      Smoking status: Never Smoker      Smokeless tobacco: Never Used    Alcohol use:  Yes      Alcohol/week: 0.0 standard drinks      Comment: though does not think she is pregnant, just wants to be sure)    ADHD  Did not do well with ritalin  Stable on Concerta; RF given    RTC 1 yr with Dr. America Rebollar, 01/21/21, 6:29 PM

## 2021-01-25 ENCOUNTER — TELEPHONE (OUTPATIENT)
Dept: INTERNAL MEDICINE CLINIC | Facility: CLINIC | Age: 26
End: 2021-01-25

## 2021-01-25 LAB
ABSOLUTE BASOPHILS: 59 CELLS/UL (ref 0–200)
ABSOLUTE EOSINOPHILS: 29 CELLS/UL (ref 15–500)
ABSOLUTE LYMPHOCYTES: 1284 CELLS/UL (ref 850–3900)
ABSOLUTE MONOCYTES: 519 CELLS/UL (ref 200–950)
ABSOLUTE NEUTROPHILS: 7909 CELLS/UL (ref 1500–7800)
ALBUMIN/GLOBULIN RATIO: 1.5 (CALC) (ref 1–2.5)
ALBUMIN: 4.4 G/DL (ref 3.6–5.1)
ALKALINE PHOSPHATASE: 59 U/L (ref 31–125)
ALT: 9 U/L (ref 6–29)
AST: 16 U/L (ref 10–30)
BASOPHILS: 0.6 %
BILIRUBIN, TOTAL: 0.7 MG/DL (ref 0.2–1.2)
BUN: 11 MG/DL (ref 7–25)
CALCIUM: 9.6 MG/DL (ref 8.6–10.2)
CARBON DIOXIDE: 27 MMOL/L (ref 20–32)
CHLAMYDIA TRACHOMATIS$RNA, TMA: NOT DETECTED
CHLORIDE: 103 MMOL/L (ref 98–110)
CHOL/HDLC RATIO: 2.5 (CALC)
CHOLESTEROL, TOTAL: 169 MG/DL
CREATININE: 0.74 MG/DL (ref 0.5–1.1)
EGFR IF AFRICN AM: 131 ML/MIN/1.73M2
EGFR IF NONAFRICN AM: 113 ML/MIN/1.73M2
EOSINOPHILS: 0.3 %
GLOBULIN: 3 G/DL (CALC) (ref 1.9–3.7)
GLUCOSE: 86 MG/DL (ref 65–99)
HCG, TOTAL, QL: NEGATIVE
HDL CHOLESTEROL: 68 MG/DL
HEMATOCRIT: 42.7 % (ref 35–45)
HEMOGLOBIN: 14.2 G/DL (ref 11.7–15.5)
LDL-CHOLESTEROL: 80 MG/DL (CALC)
LYMPHOCYTES: 13.1 %
MCH: 29.6 PG (ref 27–33)
MCHC: 33.3 G/DL (ref 32–36)
MCV: 89 FL (ref 80–100)
MONOCYTES: 5.3 %
MPV: 11.5 FL (ref 7.5–12.5)
NEISSERIA GONORRHOEAE$RNA, TMA: NOT DETECTED
NEUTROPHILS: 80.7 %
NON-HDL CHOLESTEROL: 101 MG/DL (CALC)
PLATELET COUNT: 261 THOUSAND/UL (ref 140–400)
POTASSIUM: 4 MMOL/L (ref 3.5–5.3)
PROTEIN, TOTAL: 7.4 G/DL (ref 6.1–8.1)
RDW: 11.9 % (ref 11–15)
RED BLOOD CELL COUNT: 4.8 MILLION/UL (ref 3.8–5.1)
SODIUM: 139 MMOL/L (ref 135–146)
TRIGLYCERIDES: 110 MG/DL
TSH: 1.25 MIU/L
VITAMIN D, 25-OH, TOTAL: 16 NG/ML (ref 30–100)
WHITE BLOOD CELL COUNT: 9.8 THOUSAND/UL (ref 3.8–10.8)

## 2021-01-25 NOTE — TELEPHONE ENCOUNTER
Patient calling, would like to discuss lab results from 1/22/2021.     Best call back number [phone]

## 2021-01-25 NOTE — TELEPHONE ENCOUNTER
Hello,     [facility], I just wanted to see how long it took to get results.      Thanks !   Maria Teresa Doe, RN  to Hortensia Guerra          1/24/21 12:11 PM  Rodrick Glaser,     I see that blood work was ordered for you by

## 2021-01-25 NOTE — TELEPHONE ENCOUNTER
To Dr. Remington Osborne: can you please look over CBC. Otherwise results can wait for Dr. Vito Foreman return (ordering MD).

## 2021-01-25 NOTE — TELEPHONE ENCOUNTER
Message noted. Labs reviewed. Noted is neutrophils at 7909. White count normal.  This can wait for Dr. Delfin Case. Note is made of vitamin D at 12 and this also can wait for Dr. Delfin Case.

## 2021-01-26 RX ORDER — CHOLECALCIFEROL (VITAMIN D3) 50 MCG
4000 TABLET ORAL DAILY
Qty: 1 TABLET | Refills: 0 | COMMUNITY
Start: 2021-01-26

## 2021-01-26 NOTE — TELEPHONE ENCOUNTER
STD testing neg. Pregnancy test neg  All labs normal except low vitamin D -- rec OTC vitamin D 4000 units/day.   Please update med module

## 2021-01-26 NOTE — TELEPHONE ENCOUNTER
I spoke with patient and relayed Dr. Ada Cuevas message. She verbalized understanding.  Vitamin D added to medication list.

## 2021-01-26 NOTE — TELEPHONE ENCOUNTER
To Dr. Eulalia Murillo - see below. Spoke with pt who did not see STD results on MyChart - reviewed with pt, understanding verbalized.

## 2021-02-17 ENCOUNTER — OFFICE VISIT (OUTPATIENT)
Dept: DERMATOLOGY CLINIC | Facility: CLINIC | Age: 26
End: 2021-02-17
Payer: COMMERCIAL

## 2021-02-17 DIAGNOSIS — L85.8 KERATOSIS PILARIS: ICD-10-CM

## 2021-02-17 DIAGNOSIS — D22.9 MULTIPLE NEVI: Primary | ICD-10-CM

## 2021-02-17 DIAGNOSIS — D23.9 BENIGN NEOPLASM OF SKIN, UNSPECIFIED LOCATION: ICD-10-CM

## 2021-02-17 PROCEDURE — 11102 TANGNTL BX SKIN SINGLE LES: CPT | Performed by: DERMATOLOGY

## 2021-02-17 PROCEDURE — 99213 OFFICE O/P EST LOW 20 MIN: CPT | Performed by: DERMATOLOGY

## 2021-02-20 NOTE — PROGRESS NOTES
The pathology report from last visit showed right inguinal crease IDN . Please log in test results, send biopsy results letter. Pt to rtc 1 year or prn.

## 2021-02-22 NOTE — PROGRESS NOTES
Logged in path book. Letter sent to pt via [employer] regarding pathology results and KMT's recommendations.

## 2021-03-01 NOTE — PROGRESS NOTES
Operative Report                     Shave/  Tangential biopsy     Clinical diagnosis:  Rule out atypical nevus changing lesion at the right inguinal crease irritated, more elevated 0.3 cm papule  Size of lesion:    Location:    Procedure:     With pa

## 2021-03-01 NOTE — PROGRESS NOTES
April Urias is a 26year old female. HPI:     CC:  Patient presents with:  Derm Problem: LOV 8/24/18. Patient presents for full body check. Tanning bed use in college. No personal or family hx of skin ca.         Allergies:  Patient has no known all Years of education: Not on file      Highest education level: Not on file    Occupational History      Not on file    Social Needs      Financial resource strain: Not on file      Food insecurity        Worry: Not on file        Inability: Not on file elevated cholesterol   • Cancer Paternal Grandmother         ovarian cancer? • Heart Attack Neg         Sudden death/MI under age 54, paternal grandfather, maternal grandfather   • Glaucoma Neg        There were no vitals filed for this visit.     HPI: types were placed in this encounter.       Meds & Refills for this Visit:  Requested Prescriptions      No prescriptions requested or ordered in this encounter         Multiple nevi  (primary encounter diagnosis)  Keratosis pilaris  Benign neoplasm of skin, chart, prior notes obtaining history: 5 minutes, medical exam :10 minutes, notes on body map, plan, counseling 10minutes My total time spent caring for the patient on the day of the encounter: 25 minutes

## 2021-04-23 ENCOUNTER — TELEPHONE (OUTPATIENT)
Dept: INTERNAL MEDICINE CLINIC | Facility: CLINIC | Age: 26
End: 2021-04-23

## 2021-04-23 ENCOUNTER — PATIENT MESSAGE (OUTPATIENT)
Dept: INTERNAL MEDICINE CLINIC | Facility: CLINIC | Age: 26
End: 2021-04-23

## 2021-04-23 RX ORDER — METHYLPHENIDATE HYDROCHLORIDE 27 MG/1
27 TABLET ORAL DAILY
Qty: 30 TABLET | Refills: 0 | Status: CANCELLED | OUTPATIENT
Start: 2021-04-23 | End: 2021-05-23

## 2021-04-23 NOTE — TELEPHONE ENCOUNTER
----- Message from Aislinn christian. Chapin Moreau sent at 4/23/2021  8:00 AM CDT -----  Regarding: Prescription Question  Contact: [phone]  Hi Dr Patricia Rico! I was wondering if I could get a refill for my concerta.  If so - I would like to change the pharmacy to the

## 2021-04-23 NOTE — TELEPHONE ENCOUNTER
To MD:  The above refill request is for a controlled substance. Please review pended medication order. Print and sign for staff to fax to pharmacy or prescribe electronically.     Last office visit:1/21/21Last time refill sent and quantity/refills:3/24/2

## 2021-04-23 NOTE — TELEPHONE ENCOUNTER
Looks like LARON has been managing this patient's Concerta as she has taken over, I think we should stay with a consistent provider for this nursing make sure the patient can wait until Tuesday or Wednesday for this to be addressed by LARON

## 2021-04-23 NOTE — TELEPHONE ENCOUNTER
From: Felipa Johnson  To: Fany Castillo DO  Sent: 4/23/2021 8:00 AM CDT  Subject: Prescription Question    Hi Dr Patricia Rico! I was wondering if I could get a refill for my concerta.  If so - I would like to change the pharmacy to the [facility] on [address]

## 2021-04-26 ENCOUNTER — PATIENT MESSAGE (OUTPATIENT)
Dept: INTERNAL MEDICINE CLINIC | Facility: CLINIC | Age: 26
End: 2021-04-26

## 2021-04-26 NOTE — TELEPHONE ENCOUNTER
From: Jerry Mendoza  To: Justin Khan DO  Sent: 4/26/2021 8:10 AM CDT  Subject: Prescription Question    Dr. Redd Rock,    Just checking back in on my previous request regarding my concerta prescription.  I also have a change of pharmacy request for this - t

## 2021-04-27 RX ORDER — METHYLPHENIDATE HYDROCHLORIDE 27 MG/1
27 TABLET ORAL DAILY
Qty: 30 TABLET | Refills: 0 | Status: SHIPPED | OUTPATIENT
Start: 2021-04-27 | End: 2021-05-27

## 2021-04-27 RX ORDER — METHYLPHENIDATE HYDROCHLORIDE 27 MG/1
27 TABLET ORAL DAILY
Qty: 30 TABLET | Refills: 0 | Status: SHIPPED | OUTPATIENT
Start: 2021-06-28 | End: 2021-07-28

## 2021-04-27 RX ORDER — METHYLPHENIDATE HYDROCHLORIDE 27 MG/1
27 TABLET ORAL DAILY
Qty: 30 TABLET | Refills: 0 | Status: SHIPPED | OUTPATIENT
Start: 2021-05-28 | End: 2021-06-27

## 2021-04-27 NOTE — TELEPHONE ENCOUNTER
From: Constance Guillory  To: Kristy Nieto DO  Sent: 4/26/2021 8:44 AM CDT  Subject: Prescription Question    Hello,     My prescription for concerta is 75FT, and I would like my pharmacy to be changed to the [facility] on [address] [place] [address]

## 2021-05-31 ENCOUNTER — TELEPHONE (OUTPATIENT)
Dept: INTERNAL MEDICINE CLINIC | Facility: CLINIC | Age: 26
End: 2021-05-31

## 2021-06-01 RX ORDER — METHYLPHENIDATE HYDROCHLORIDE 27 MG/1
27 TABLET ORAL DAILY
Qty: 30 TABLET | Refills: 0 | OUTPATIENT
Start: 2021-06-01 | End: 2021-07-01

## 2021-06-01 NOTE — TELEPHONE ENCOUNTER
Current refill request refused due to refill is either a duplicate request or has active refills at the pharmacy. Check previous templates.     Requested Prescriptions     Refused Prescriptions Disp Refills   • Methylphenidate HCl ER (CONCERTA) 27 MG Oral

## 2021-07-07 NOTE — TELEPHONE ENCOUNTER
Prior Authorization request rec'd from [facility] for:  Methylphenidate   Fax placed in purple folder  Tasked to [employer]

## 2021-07-09 NOTE — TELEPHONE ENCOUNTER
PA for Methylphenidate faxed to # [phone] confirmation received Placed in red folder routed to Rx

## 2021-07-11 DIAGNOSIS — Z30.9 ENCOUNTER FOR CONTRACEPTIVE MANAGEMENT, UNSPECIFIED TYPE: ICD-10-CM

## 2021-07-12 RX ORDER — NORGESTIMATE AND ETHINYL ESTRADIOL 0.25-0.035
KIT ORAL
Qty: 84 TABLET | Refills: 1 | Status: SHIPPED | OUTPATIENT
Start: 2021-07-12 | End: 2022-01-02

## 2021-08-17 ENCOUNTER — PATIENT MESSAGE (OUTPATIENT)
Dept: INTERNAL MEDICINE CLINIC | Facility: CLINIC | Age: 26
End: 2021-08-17

## 2021-08-18 NOTE — TELEPHONE ENCOUNTER
From: Jerry Mendoza  To: Justin Khan DO  Sent: 8/17/2021 2:34 PM CDT  Subject: Prescription Question    Hi Dr Redd Rock,    It looks like my insurance doesn’t cover my concerta anymore. I am unsure if that it the brand name or off brand.  If both, is there

## 2021-08-20 NOTE — TELEPHONE ENCOUNTER
petty sent  As a first step, we recommend finding out the covered formulary alternatives that have an acceptable copay. Then  can chose from those and change the prescription.   Thank you,  [employer], PharmD

## 2021-08-26 RX ORDER — METHYLPHENIDATE HYDROCHLORIDE 27 MG/1
27 TABLET ORAL DAILY
Qty: 30 TABLET | Refills: 0 | Status: SHIPPED | OUTPATIENT
Start: 2021-10-27 | End: 2021-10-26

## 2021-08-26 RX ORDER — METHYLPHENIDATE HYDROCHLORIDE 27 MG/1
27 TABLET ORAL DAILY
Qty: 30 TABLET | Refills: 0 | Status: SHIPPED | OUTPATIENT
Start: 2021-09-26 | End: 2021-10-26

## 2021-08-26 RX ORDER — METHYLPHENIDATE HYDROCHLORIDE 27 MG/1
27 TABLET ORAL DAILY
Qty: 30 TABLET | Refills: 0 | Status: SHIPPED | OUTPATIENT
Start: 2021-08-26 | End: 2021-08-27

## 2021-10-03 ENCOUNTER — PATIENT MESSAGE (OUTPATIENT)
Dept: INTERNAL MEDICINE CLINIC | Facility: CLINIC | Age: 26
End: 2021-10-03

## 2021-10-05 RX ORDER — METHYLPHENIDATE HYDROCHLORIDE 27 MG/1
27 TABLET ORAL DAILY
Qty: 30 TABLET | Refills: 0 | Status: SHIPPED | OUTPATIENT
Start: 2021-10-05 | End: 2021-10-26

## 2021-10-05 RX ORDER — METHYLPHENIDATE HYDROCHLORIDE 27 MG/1
27 TABLET ORAL DAILY
Qty: 30 TABLET | Refills: 0 | Status: SHIPPED | OUTPATIENT
Start: 2021-12-06 | End: 2021-10-26

## 2021-10-05 RX ORDER — METHYLPHENIDATE HYDROCHLORIDE 27 MG/1
27 TABLET ORAL DAILY
Qty: 30 TABLET | Refills: 0 | Status: SHIPPED | OUTPATIENT
Start: 2021-11-05 | End: 2021-10-26

## 2021-10-05 NOTE — TELEPHONE ENCOUNTER
From: Dottie Kemp  To: Samara Shetty DO  Sent: 10/3/2021 6:58 PM CDT  Subject: Concerta Refill    Hi Dr. Bacilio Michelle,    Could I get a refill for my Concerta and can we also have it sent to the [facility][1] on [address] [place] [address]? Thank you!     Queenie Solares

## 2021-10-05 NOTE — TELEPHONE ENCOUNTER
Pt has active concerta refills at [facility] for 9/27/21 and 10/27/21. No  info available. Mychart to pt.

## 2021-10-05 NOTE — TELEPHONE ENCOUNTER
To Dr. Ness Model----    Please see below. Pt aware active refills at [facility], moved out of area and requesting pharmacy change to [address] LOV: 1/21/21  No  info available.      IF agreeable please re-route to nursing to call [facility]

## 2021-10-07 ENCOUNTER — PATIENT MESSAGE (OUTPATIENT)
Dept: INTERNAL MEDICINE CLINIC | Facility: CLINIC | Age: 26
End: 2021-10-07

## 2021-10-26 ENCOUNTER — LAB ENCOUNTER (OUTPATIENT)
Dept: LAB | Age: 26
End: 2021-10-26
Attending: INTERNAL MEDICINE
Payer: COMMERCIAL

## 2021-10-26 ENCOUNTER — OFFICE VISIT (OUTPATIENT)
Dept: INTERNAL MEDICINE CLINIC | Facility: CLINIC | Age: 26
End: 2021-10-26
Payer: COMMERCIAL

## 2021-10-26 ENCOUNTER — PATIENT MESSAGE (OUTPATIENT)
Dept: INTERNAL MEDICINE CLINIC | Facility: CLINIC | Age: 26
End: 2021-10-26

## 2021-10-26 VITALS
WEIGHT: 117 LBS | DIASTOLIC BLOOD PRESSURE: 78 MMHG | BODY MASS INDEX: 22 KG/M2 | SYSTOLIC BLOOD PRESSURE: 122 MMHG | TEMPERATURE: 98 F | OXYGEN SATURATION: 100 % | RESPIRATION RATE: 16 BRPM | HEART RATE: 110 BPM

## 2021-10-26 DIAGNOSIS — Z11.3 SCREENING FOR STD (SEXUALLY TRANSMITTED DISEASE): Primary | ICD-10-CM

## 2021-10-26 DIAGNOSIS — N91.2 AMENORRHEA: ICD-10-CM

## 2021-10-26 PROCEDURE — 3074F SYST BP LT 130 MM HG: CPT | Performed by: INTERNAL MEDICINE

## 2021-10-26 PROCEDURE — 87389 HIV-1 AG W/HIV-1&-2 AB AG IA: CPT | Performed by: INTERNAL MEDICINE

## 2021-10-26 PROCEDURE — 86780 TREPONEMA PALLIDUM: CPT | Performed by: INTERNAL MEDICINE

## 2021-10-26 PROCEDURE — 36415 COLL VENOUS BLD VENIPUNCTURE: CPT | Performed by: INTERNAL MEDICINE

## 2021-10-26 PROCEDURE — 81025 URINE PREGNANCY TEST: CPT | Performed by: INTERNAL MEDICINE

## 2021-10-26 PROCEDURE — 99213 OFFICE O/P EST LOW 20 MIN: CPT | Performed by: INTERNAL MEDICINE

## 2021-10-26 PROCEDURE — 3078F DIAST BP <80 MM HG: CPT | Performed by: INTERNAL MEDICINE

## 2021-10-26 RX ORDER — GARLIC EXTRACT 500 MG
1 CAPSULE ORAL DAILY
COMMUNITY
End: 2022-02-03

## 2021-10-26 NOTE — TELEPHONE ENCOUNTER
From: Felipa Johnson  Sent: 10/26/2021 2:40 PM CDT  To: Ernestine Garcia [facility] Clinical Staff  Subject: results    That is great news thank you for letting me know.

## 2021-10-26 NOTE — PROGRESS NOTES
Earlean Bamberger is a 23year old female. Patient presents with: Follow - Up: Pt presents for request for STD/STI testing. Denies current symtoms. Pt declines flu vaccine today.        HPI:   Earlean Bamberger is a 23year old female who presents for: st Never Used    Vaping Use      Vaping Use: Some days    Alcohol use:  Yes      Alcohol/week: 0.0 standard drinks      Comment: 1 beer, occasionally    Drug use: Yes      Types: Cannabis      Comment: occasionally         REVIEW OF SYSTEMS:   GENERAL: feels w

## 2021-10-27 ENCOUNTER — PATIENT MESSAGE (OUTPATIENT)
Dept: INTERNAL MEDICINE CLINIC | Facility: CLINIC | Age: 26
End: 2021-10-27

## 2021-10-28 NOTE — TELEPHONE ENCOUNTER
From: Alexia Mayfield  Sent: 10/27/2021 9:45 PM CDT  To: [facility] Clinical Staff  Subject: results    [address]  Thank you :)

## 2021-11-11 ENCOUNTER — TELEPHONE (OUTPATIENT)
Dept: OPTOMETRY | Facility: CLINIC | Age: 26
End: 2021-11-11

## 2021-11-11 ENCOUNTER — TELEPHONE (OUTPATIENT)
Dept: INTERNAL MEDICINE CLINIC | Facility: CLINIC | Age: 26
End: 2021-11-11

## 2021-11-11 NOTE — TELEPHONE ENCOUNTER
----- Message from Aislinn christian.  Gabriela Philip sent at 11/11/2021 11:55 AM CST -----  Regarding: Panic Attack/ Stress Symptoms   Hi Dr Yasmin Padron,    I just wanted to reach out because a couple weeks ago I had what I assume was a panic attack that was fairly scary I fel

## 2021-11-11 NOTE — TELEPHONE ENCOUNTER
LM on  relaying Dr. Yovani Schultz detailed message (OK per HIPAA). Instructed pt to call our office back in order to schedule office visit.

## 2021-11-11 NOTE — TELEPHONE ENCOUNTER
Spoke to pt who reports feeling dizzy at night lately when driving-does not wear glasses at night- states she was told in the past she should be wearing glasses.    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  He was recently admitted - for hepatic encephalopathy. During admission with notable drop in H&H s/p EGD with banding of esophageal varices x3.     Admitted  for potential OLT and is now being admitted to SICU  s/p DBD OLT (reperfusion ) with duct to duct biliary anastomosis. Notable colon distension in OR suspected due to lactulose dosing, rectal tube placed for decompression.    24 Hours: Remains on Arnoldo, PA pressures improving.     Impression:  -- hep B cirrhosis s/p DBD OLT ()  -- Acute post op pain  -- Acute post op blood loss on chronic anemia  -- Thrombocytopenia  -- CKD stage III, baseline SCr ~1.4-1.8   -- possible IgA  nephropathy      ASSESSMENT/PLAN:  Neuro: Intact.  Pain controlled.  -- Oxycodone PRN  -- PT/OT - ambulating in halls    Pulmonary: Saturating well on minimal O2 requirements.  -- Pulmonary hygiene: IS/cough and deep breathe  -- Wean O2 to keep SpO2 >92%     CV/Renal: Normotensive. NSR. PA pressures improving with diuresis. SCr up trending up slowly, likely secondary to diuresis and tacro level. Good UO. No urgent indication for further diuresis today.   -- Goal -160mmHg  -- PRN hydralazine  -- DC swan  -- DC childress  -- Nephrology following    GI: s/p OLT with down trending aminotransferases and Tbili. Abdomen quiet, no flatus. No nausea. Tolerating clear liquids.   -- Full liquids  -- Bowel hygiene: Colace/senna, miralax  --Transplant surgery following, d/w Dr. Chahal    Heme: H/H  essentially unchanged from yesterday. Ongoing thrombocytopenia. No evidence of active bleeding.   -- Hold bASA for anastomosis and pharmacologic VTE ppx until platelets > 100    Endocrine: Steroid and stress induced hyperglycemia, maintained within ICU target range.   -- SSI  -- Levothyroxine    ID: Afebrile. No leukocytosis.  -- high risk antimicrobials due to transfusion load intra-op   --  linezolid and zosyn completed    -- Micafungin for 7 days   -- entecavir   -- HBV quant < 10 (5/24/2021)    Immunosuppression:  -- tacro, check daily level  -- steroid taper  -- MMF    Goals/Disposition: Maintain in ICU    PERTINENT DIAGNOSTICS/PROCEDURES:  7/20 OR:  OLT  7/21 Liver US: Patent hepatic vasculature with normal duplex waveforms s/p OLT.  Increased portal venous flow    BEST PRACTICES:  - VTE prophylaxis: SCDs only  - SUP: protonix  - LDA: RIJ introducer, RIJ MAC with PAC, monroe [place] JP x2   - Nutrition: Full liquid diet  - Therapy/mobilization: pt/ot  - Goals of care note documented: 7/20    ================================================================  Subjective:  Slept well, asking to walk.  Pain controlled.       I/O last 3 completed shifts:  In: 1640.4 [P.O.:320; I.V.:461.4; Blood:205; IV Piggyback:654]  Out: 4313 [Urine:3705; Drains:608]  No intake/output data recorded.    Vital Last Value 24 Hour Range   Temperature 98.2 °F (36.8 °C) (07/23/21 0400) Temp  Min: 97.6 °F (36.4 °C)  Max: 98.3 °F (36.8 °C)   Pulse (!) 59 (07/23/21 0700) Pulse  Min: 56  Max: 71   Respiratory 17 (07/23/21 0700) Resp  Min: 10  Max: 28   Non-Invasive  Blood Pressure (!) 146/81 (07/23/21 0600) BP  Min: 124/58  Max: 156/84   Pulse Oximetry 100 % (07/23/21 0700) SpO2  Min: 92 %  Max: 100 %   Arterial   Blood Pressure (!) 155/86 (07/22/21 0900) Arterial Line BP  Min: 154/83  Max: 156/88      Physical Exam:  General: Pleasant male. Up in chair. NAD.   Neuro: Alert and oriented. AGUILERA, strength 5/5 and symmetric throughout  extremities.   ENT: Sclera mildly icteric, moist oral mucosa  Neck: Supple, CVC sites clean  Chest:  Respirations non labored. Breath sounds clear but diminished bases  Heart: RRR, Normal S1S2, no m/r/g  Abdomen: Semi-firm, rounded, hypoactive bowel tones. Fadia incision with dressing c/d/i.  JASMINE x2 with serosanguinous output.    Extremities: Warm. Generalized edema. Pulses palpable and symmetric throughout extremities.   Integ: Warm and dry. Jaundice.  Abdomen as above.       Pertinent Reviewed: Allergies, Medications, Labs, Imaging and Physician and Nursing Notes    [facility]  for above assessment and exam    ACCS Attestation  This patient is critically ill as documented above. I evaluated the patient and reviewed imaging and laboratory data. I discussed events and interventions with Santo Saini MD  who has reviewed the diagnostic and treatment strategy.. Critical care services I provided [id number] ([facility] care, level III).    Noy Rosales PA-C  [facility]  [phone]

## 2021-11-11 NOTE — TELEPHONE ENCOUNTER
Yes ok to add to any MD approval slot; in the meantime, would make sure she is hydrating well and wearing glasses at all times.

## 2021-11-11 NOTE — TELEPHONE ENCOUNTER
Per new pt states having trouble driving at night has a twitch and dizziness said can't wait until 1/17 please advise

## 2021-11-16 NOTE — PROGRESS NOTES
Che Blair is a 23year old female. Patient presents with:  Dizziness: Pt reports dizziness x3 weeks that has improved with use of prescription glasses.        HPI:   Che Blair is a 23year old female who presents for: panic attack    Report Smoker      Smokeless tobacco: Never Used    Vaping Use      Vaping Use: Some days    Alcohol use:  Yes      Alcohol/week: 0.0 standard drinks      Comment: 1 beer, occasionally    Drug use: Yes      Types: Cannabis      Comment: occasionally         REVIEW

## 2021-12-16 ENCOUNTER — PATIENT MESSAGE (OUTPATIENT)
Dept: INTERNAL MEDICINE CLINIC | Facility: CLINIC | Age: 26
End: 2021-12-16

## 2021-12-16 NOTE — TELEPHONE ENCOUNTER
From: Catrachito Valera  To: Gonzalez Jerez DO  Sent: 12/16/2021 7:35 AM CST  Subject: Concerta Refill    Hi Dr Baltazar Gillespie,    Could I please get a refill on my Concerta? Thank you.     Rc Ortiz details…

## 2021-12-16 NOTE — TELEPHONE ENCOUNTER
To MD:  The above refill request is for a controlled substance. Please review pended medication order. Print and sign for staff to fax to pharmacy or prescribe electronically. Last office visit: 11/14/21 (acute).  Last PX 1/21/21  Last time refill sen

## 2021-12-20 RX ORDER — METHYLPHENIDATE HYDROCHLORIDE 27 MG/1
27 TABLET ORAL DAILY
Qty: 30 TABLET | Refills: 0 | Status: SHIPPED | OUTPATIENT
Start: 2022-02-16 | End: 2022-02-03

## 2021-12-20 RX ORDER — METHYLPHENIDATE HYDROCHLORIDE 27 MG/1
27 TABLET ORAL DAILY
Qty: 30 TABLET | Refills: 0 | Status: SHIPPED | OUTPATIENT
Start: 2022-01-16 | End: 2022-02-15

## 2021-12-20 RX ORDER — METHYLPHENIDATE HYDROCHLORIDE 27 MG/1
27 TABLET ORAL DAILY
Qty: 30 TABLET | Refills: 0 | Status: SHIPPED | OUTPATIENT
Start: 2021-12-20 | End: 2022-01-19

## 2021-12-31 DIAGNOSIS — Z30.9 ENCOUNTER FOR CONTRACEPTIVE MANAGEMENT, UNSPECIFIED TYPE: ICD-10-CM

## 2022-01-02 NOTE — TELEPHONE ENCOUNTER
Upcoming appt 1/25/22    Refill request is for a maintenance medication and has met the criteria specified in the Ambulatory Medication Refill Standing Order for eligibility, visits, laboratory, alerts and was sent to the requested pharmacy.     Requested P

## 2022-02-03 ENCOUNTER — OFFICE VISIT (OUTPATIENT)
Dept: INTERNAL MEDICINE CLINIC | Facility: CLINIC | Age: 27
End: 2022-02-03
Payer: COMMERCIAL

## 2022-02-03 VITALS
WEIGHT: 113 LBS | DIASTOLIC BLOOD PRESSURE: 64 MMHG | SYSTOLIC BLOOD PRESSURE: 104 MMHG | HEART RATE: 80 BPM | TEMPERATURE: 99 F | HEIGHT: 61 IN | BODY MASS INDEX: 21.34 KG/M2

## 2022-02-03 DIAGNOSIS — F41.9 ANXIETY: ICD-10-CM

## 2022-02-03 DIAGNOSIS — Z11.3 SCREENING FOR STD (SEXUALLY TRANSMITTED DISEASE): ICD-10-CM

## 2022-02-03 DIAGNOSIS — Z00.00 ROUTINE HEALTH MAINTENANCE: Primary | ICD-10-CM

## 2022-02-03 DIAGNOSIS — F90.0 ATTENTION DEFICIT HYPERACTIVITY DISORDER (ADHD), PREDOMINANTLY INATTENTIVE TYPE: ICD-10-CM

## 2022-02-03 PROCEDURE — 3078F DIAST BP <80 MM HG: CPT | Performed by: INTERNAL MEDICINE

## 2022-02-03 PROCEDURE — 99395 PREV VISIT EST AGE 18-39: CPT | Performed by: INTERNAL MEDICINE

## 2022-02-03 PROCEDURE — 3008F BODY MASS INDEX DOCD: CPT | Performed by: INTERNAL MEDICINE

## 2022-02-03 PROCEDURE — 3074F SYST BP LT 130 MM HG: CPT | Performed by: INTERNAL MEDICINE

## 2022-02-03 RX ORDER — NORGESTIMATE AND ETHINYL ESTRADIOL 0.25-0.035
1 KIT ORAL DAILY
Qty: 84 TABLET | Refills: 4 | Status: SHIPPED | OUTPATIENT
Start: 2022-02-03 | End: 2023-02-03

## 2022-02-03 RX ORDER — METHYLPHENIDATE HYDROCHLORIDE 27 MG/1
27 TABLET ORAL DAILY
Qty: 30 TABLET | Refills: 0 | Status: SHIPPED | OUTPATIENT
Start: 2022-04-06 | End: 2022-05-06

## 2022-02-03 RX ORDER — METHYLPHENIDATE HYDROCHLORIDE 27 MG/1
27 TABLET ORAL DAILY
Qty: 30 TABLET | Refills: 0 | Status: SHIPPED | OUTPATIENT
Start: 2022-03-06 | End: 2022-04-05

## 2022-02-03 RX ORDER — METHYLPHENIDATE HYDROCHLORIDE 27 MG/1
27 TABLET ORAL DAILY
Qty: 30 TABLET | Refills: 0 | Status: SHIPPED | OUTPATIENT
Start: 2022-02-03 | End: 2022-03-05

## 2022-05-03 ENCOUNTER — PATIENT MESSAGE (OUTPATIENT)
Dept: INTERNAL MEDICINE CLINIC | Facility: CLINIC | Age: 27
End: 2022-05-03

## 2022-05-04 RX ORDER — METHYLPHENIDATE HYDROCHLORIDE 27 MG/1
27 TABLET ORAL DAILY
Qty: 30 TABLET | Refills: 0 | Status: SHIPPED | OUTPATIENT
Start: 2022-05-07 | End: 2022-05-05

## 2022-05-04 RX ORDER — METHYLPHENIDATE HYDROCHLORIDE 27 MG/1
27 TABLET ORAL DAILY
Qty: 30 TABLET | Refills: 0 | Status: SHIPPED | OUTPATIENT
Start: 2022-06-07 | End: 2022-05-05

## 2022-05-04 RX ORDER — METHYLPHENIDATE HYDROCHLORIDE 27 MG/1
27 TABLET ORAL DAILY
Qty: 30 TABLET | Refills: 0 | Status: SHIPPED | OUTPATIENT
Start: 2022-07-08 | End: 2022-05-05

## 2022-05-04 NOTE — TELEPHONE ENCOUNTER
To MD:  The above refill request is for a controlled substance. Please review pended medication order. Pended 90 day Panel of [employer] 13OS. Print and sign for staff to fax to pharmacy or prescribe electronically.     Last office visit: 2/3/22  Last time refill sent and quantity/refills:  Last sent 4/6/22 #30 w/ 0 RFs

## 2022-05-05 ENCOUNTER — PATIENT MESSAGE (OUTPATIENT)
Dept: INTERNAL MEDICINE CLINIC | Facility: CLINIC | Age: 27
End: 2022-05-05

## 2022-05-05 RX ORDER — METHYLPHENIDATE HYDROCHLORIDE 27 MG/1
27 TABLET ORAL DAILY
Qty: 30 TABLET | Refills: 0 | Status: SHIPPED | OUTPATIENT
Start: 2022-05-05 | End: 2022-06-04

## 2022-05-06 RX ORDER — METHYLPHENIDATE HYDROCHLORIDE 27 MG/1
27 TABLET ORAL DAILY
Qty: 30 TABLET | Refills: 0 | OUTPATIENT
Start: 2022-05-06 | End: 2022-06-05

## 2022-05-06 NOTE — TELEPHONE ENCOUNTER
From: Rosa Salgado  Sent: 5/5/2022 8:35 PM CDT  To: Ernestine Garcia [facility] Clinical Staff  Subject: Concerta Refill    Thank you!

## 2022-05-25 ENCOUNTER — PATIENT MESSAGE (OUTPATIENT)
Dept: DERMATOLOGY CLINIC | Facility: CLINIC | Age: 27
End: 2022-05-25

## 2022-05-25 ENCOUNTER — OFFICE VISIT (OUTPATIENT)
Dept: DERMATOLOGY CLINIC | Facility: CLINIC | Age: 27
End: 2022-05-25
Payer: COMMERCIAL

## 2022-05-25 DIAGNOSIS — L50.9 URTICARIA: Primary | ICD-10-CM

## 2022-05-25 PROCEDURE — 99213 OFFICE O/P EST LOW 20 MIN: CPT | Performed by: DERMATOLOGY

## 2022-05-25 RX ORDER — CLOBETASOL PROPIONATE 0.5 MG/G
1 CREAM TOPICAL 2 TIMES DAILY
Qty: 60 G | Refills: 3 | Status: SHIPPED | OUTPATIENT
Start: 2022-05-25 | End: 2023-05-25

## 2022-05-25 RX ORDER — FEXOFENADINE HCL 180 MG/1
180 TABLET ORAL DAILY
Qty: 30 TABLET | Refills: 3 | Status: SHIPPED | OUTPATIENT
Start: 2022-05-25

## 2022-05-25 NOTE — TELEPHONE ENCOUNTER
Dr. Felicita Leos please see pt's photos and description of symptoms - book next available or do you want to see her asap?

## 2022-07-17 ENCOUNTER — TELEPHONE (OUTPATIENT)
Dept: INTERNAL MEDICINE CLINIC | Facility: CLINIC | Age: 27
End: 2022-07-17

## 2022-07-18 RX ORDER — METHYLPHENIDATE HYDROCHLORIDE 27 MG/1
TABLET, EXTENDED RELEASE ORAL
Qty: 30 TABLET | Refills: 0 | OUTPATIENT
Start: 2022-07-18

## 2022-07-18 NOTE — TELEPHONE ENCOUNTER
Spoke with [facility], last dispense date 6/13/22. RX dated 7/8/22 is on file at [facility] in [place], they cannot transfer. Left voicemail with Denise Ward to cancel rx dated 7/8/22. To MD:  The above refill request is for a controlled substance. Please review pended medication order. Print and sign for staff to fax to pharmacy or prescribe electronically.     Last office visit: 2/3/22  Last time refill sent and quantity/refills: 5/7/22 90 day panel

## 2022-07-19 RX ORDER — METHYLPHENIDATE HYDROCHLORIDE 27 MG/1
27 TABLET ORAL DAILY
Qty: 30 TABLET | Refills: 0 | Status: SHIPPED | OUTPATIENT
Start: 2022-09-18 | End: 2022-10-18

## 2022-07-19 RX ORDER — METHYLPHENIDATE HYDROCHLORIDE 27 MG/1
27 TABLET ORAL DAILY
Qty: 30 TABLET | Refills: 0 | Status: SHIPPED | OUTPATIENT
Start: 2022-07-19 | End: 2022-08-18

## 2022-07-19 RX ORDER — METHYLPHENIDATE HYDROCHLORIDE 27 MG/1
27 TABLET ORAL DAILY
Qty: 30 TABLET | Refills: 0 | Status: SHIPPED | OUTPATIENT
Start: 2022-08-18 | End: 2022-09-17

## 2022-10-17 ENCOUNTER — TELEPHONE (OUTPATIENT)
Dept: INTERNAL MEDICINE CLINIC | Facility: CLINIC | Age: 27
End: 2022-10-17

## 2022-10-17 ENCOUNTER — OFFICE VISIT (OUTPATIENT)
Dept: INTERNAL MEDICINE CLINIC | Facility: CLINIC | Age: 27
End: 2022-10-17
Payer: COMMERCIAL

## 2022-10-17 VITALS
OXYGEN SATURATION: 100 % | SYSTOLIC BLOOD PRESSURE: 118 MMHG | HEART RATE: 111 BPM | HEIGHT: 61 IN | TEMPERATURE: 98 F | DIASTOLIC BLOOD PRESSURE: 74 MMHG | WEIGHT: 123 LBS | BODY MASS INDEX: 23.22 KG/M2

## 2022-10-17 DIAGNOSIS — Z00.00 ANNUAL PHYSICAL EXAM: Primary | ICD-10-CM

## 2022-10-17 DIAGNOSIS — Z11.3 SCREENING EXAMINATION FOR STD (SEXUALLY TRANSMITTED DISEASE): ICD-10-CM

## 2022-10-17 DIAGNOSIS — Z00.00 ROUTINE HEALTH MAINTENANCE: Primary | ICD-10-CM

## 2022-10-17 PROCEDURE — 3074F SYST BP LT 130 MM HG: CPT | Performed by: INTERNAL MEDICINE

## 2022-10-17 PROCEDURE — 3078F DIAST BP <80 MM HG: CPT | Performed by: INTERNAL MEDICINE

## 2022-10-17 PROCEDURE — 3008F BODY MASS INDEX DOCD: CPT | Performed by: INTERNAL MEDICINE

## 2022-10-17 RX ORDER — CHOLECALCIFEROL (VITAMIN D3) 125 MCG
1 CAPSULE ORAL DAILY
COMMUNITY

## 2022-11-14 ENCOUNTER — TELEPHONE (OUTPATIENT)
Dept: INTERNAL MEDICINE CLINIC | Facility: CLINIC | Age: 27
End: 2022-11-14

## 2022-11-14 ENCOUNTER — PATIENT MESSAGE (OUTPATIENT)
Dept: INTERNAL MEDICINE CLINIC | Facility: CLINIC | Age: 27
End: 2022-11-14

## 2022-11-15 RX ORDER — METHYLPHENIDATE HYDROCHLORIDE 27 MG/1
TABLET, EXTENDED RELEASE ORAL
Qty: 30 TABLET | Refills: 0 | OUTPATIENT
Start: 2022-11-15 | End: 2022-12-15

## 2022-11-15 NOTE — TELEPHONE ENCOUNTER
To MD:  The above refill request is for a controlled substance. Please review pended medication order. Print and sign for staff to fax to pharmacy or prescribe electronically.     Last office visit: 10/17/22  Last time refill sent and quantity/refills: 90 day panel 7/19-9/18 [place]30/ [place] 9/4/22 #10

## 2022-11-15 NOTE — TELEPHONE ENCOUNTER
From: Hafsa Howe  To: Leyda Yeboah DO  Sent: 11/14/2022 9:46 AM CST  Subject: Concerta Prescription Refill    Hi Dr Jaja Paulino,    Could you please refill my Concerta prescription? The pharmacy at [facility] on [address] [facility] in [facility] sent through a request.     Thank you!   Yenifer Hood

## 2022-11-16 RX ORDER — METHYLPHENIDATE HYDROCHLORIDE 27 MG/1
27 TABLET ORAL DAILY
Qty: 30 TABLET | Refills: 0 | Status: SHIPPED | OUTPATIENT
Start: 2022-11-16 | End: 2022-12-16

## 2022-11-16 RX ORDER — METHYLPHENIDATE HYDROCHLORIDE 27 MG/1
27 TABLET ORAL DAILY
Qty: 30 TABLET | Refills: 0 | Status: SHIPPED | OUTPATIENT
Start: 2023-01-16 | End: 2023-02-15

## 2022-11-16 RX ORDER — METHYLPHENIDATE HYDROCHLORIDE 27 MG/1
27 TABLET ORAL DAILY
Qty: 30 TABLET | Refills: 0 | Status: SHIPPED | OUTPATIENT
Start: 2022-12-16 | End: 2023-01-15

## 2022-12-14 LAB
ABSOLUTE BASOPHILS: 61 CELLS/UL (ref 0–200)
ABSOLUTE EOSINOPHILS: 129 CELLS/UL (ref 15–500)
ABSOLUTE LYMPHOCYTES: 2706 CELLS/UL (ref 850–3900)
ABSOLUTE MONOCYTES: 562 CELLS/UL (ref 200–950)
ABSOLUTE NEUTROPHILS: 4142 CELLS/UL (ref 1500–7800)
ALBUMIN/GLOBULIN RATIO: 1.3 (CALC) (ref 1–2.5)
ALBUMIN: 4.1 G/DL (ref 3.6–5.1)
ALKALINE PHOSPHATASE: 50 U/L (ref 31–125)
ALT: 11 U/L (ref 6–29)
AST: 17 U/L (ref 10–30)
BASOPHILS: 0.8 %
BILIRUBIN, TOTAL: 0.7 MG/DL (ref 0.2–1.2)
BUN: 10 MG/DL (ref 7–25)
CALCIUM: 9.3 MG/DL (ref 8.6–10.2)
CARBON DIOXIDE: 27 MMOL/L (ref 20–32)
CHLAMYDIA TRACHOMATIS$RNA, TMA: NOT DETECTED
CHLORIDE: 105 MMOL/L (ref 98–110)
CHOL/HDLC RATIO: 3.2 (CALC)
CHOLESTEROL, TOTAL: 201 MG/DL
CREATININE: 0.86 MG/DL (ref 0.5–0.96)
EGFR: 95 ML/MIN/1.73M2
EOSINOPHILS: 1.7 %
GLOBULIN: 3.1 G/DL (CALC) (ref 1.9–3.7)
GLUCOSE: 80 MG/DL (ref 65–139)
HDL CHOLESTEROL: 63 MG/DL
HEMATOCRIT: 42.5 % (ref 35–45)
HEMOGLOBIN: 14 G/DL (ref 11.7–15.5)
LDL-CHOLESTEROL: 114 MG/DL (CALC)
LYMPHOCYTES: 35.6 %
MCH: 29.8 PG (ref 27–33)
MCHC: 32.9 G/DL (ref 32–36)
MCV: 90.4 FL (ref 80–100)
MONOCYTES: 7.4 %
MPV: 11.6 FL (ref 7.5–12.5)
NEISSERIA GONORRHOEAE$RNA, TMA: NOT DETECTED
NEUTROPHILS: 54.5 %
NON-HDL CHOLESTEROL: 138 MG/DL (CALC)
PLATELET COUNT: 337 THOUSAND/UL (ref 140–400)
POTASSIUM: 4 MMOL/L (ref 3.5–5.3)
PROTEIN, TOTAL: 7.2 G/DL (ref 6.1–8.1)
RDW: 12 % (ref 11–15)
RED BLOOD CELL COUNT: 4.7 MILLION/UL (ref 3.8–5.1)
SODIUM: 138 MMOL/L (ref 135–146)
TRIGLYCERIDES: 127 MG/DL
TSH W/REFLEX TO FT4: 2.24 MIU/L
WHITE BLOOD CELL COUNT: 7.6 THOUSAND/UL (ref 3.8–10.8)

## 2023-01-14 ENCOUNTER — PATIENT MESSAGE (OUTPATIENT)
Dept: INTERNAL MEDICINE CLINIC | Facility: CLINIC | Age: 28
End: 2023-01-14

## 2023-01-16 RX ORDER — METHYLPHENIDATE HYDROCHLORIDE 27 MG/1
27 TABLET ORAL EVERY MORNING
Qty: 30 TABLET | Refills: 0 | Status: SHIPPED | OUTPATIENT
Start: 2023-01-16 | End: 2023-02-15

## 2023-01-16 RX ORDER — METHYLPHENIDATE HYDROCHLORIDE 27 MG/1
27 TABLET ORAL DAILY
Qty: 30 TABLET | Refills: 0 | Status: CANCELLED | OUTPATIENT
Start: 2023-01-16 | End: 2023-02-15

## 2023-01-16 NOTE — TELEPHONE ENCOUNTER
To Dr. Deborah Resendez.  Medication pended to [facility] on [address] per patient request

## 2023-02-15 ENCOUNTER — OFFICE VISIT (OUTPATIENT)
Dept: INTERNAL MEDICINE CLINIC | Facility: CLINIC | Age: 28
End: 2023-02-15

## 2023-02-15 VITALS
DIASTOLIC BLOOD PRESSURE: 82 MMHG | RESPIRATION RATE: 18 BRPM | TEMPERATURE: 97 F | HEIGHT: 61 IN | WEIGHT: 130 LBS | HEART RATE: 101 BPM | OXYGEN SATURATION: 100 % | BODY MASS INDEX: 24.55 KG/M2 | SYSTOLIC BLOOD PRESSURE: 138 MMHG

## 2023-02-15 DIAGNOSIS — Z00.00 ANNUAL PHYSICAL EXAM: Primary | ICD-10-CM

## 2023-02-15 DIAGNOSIS — R19.00 PELVIC MASS: ICD-10-CM

## 2023-02-15 DIAGNOSIS — F41.9 ANXIETY: ICD-10-CM

## 2023-02-15 DIAGNOSIS — N92.6 IRREGULAR MENSES: ICD-10-CM

## 2023-02-15 DIAGNOSIS — F90.0 ATTENTION DEFICIT HYPERACTIVITY DISORDER (ADHD), PREDOMINANTLY INATTENTIVE TYPE: ICD-10-CM

## 2023-02-15 LAB
CONTROL LINE PRESENT WITH A CLEAR BACKGROUND (YES/NO): YES YES/NO
PREGNANCY TEST, URINE: NEGATIVE

## 2023-02-15 PROCEDURE — 81025 URINE PREGNANCY TEST: CPT | Performed by: INTERNAL MEDICINE

## 2023-02-15 PROCEDURE — 3075F SYST BP GE 130 - 139MM HG: CPT | Performed by: INTERNAL MEDICINE

## 2023-02-15 PROCEDURE — 3008F BODY MASS INDEX DOCD: CPT | Performed by: INTERNAL MEDICINE

## 2023-02-15 PROCEDURE — 99395 PREV VISIT EST AGE 18-39: CPT | Performed by: INTERNAL MEDICINE

## 2023-02-15 PROCEDURE — 3079F DIAST BP 80-89 MM HG: CPT | Performed by: INTERNAL MEDICINE

## 2023-02-15 RX ORDER — ALPRAZOLAM 0.25 MG/1
0.25 TABLET ORAL EVERY 6 HOURS PRN
Qty: 30 TABLET | Refills: 1 | Status: SHIPPED | OUTPATIENT
Start: 2023-02-15

## 2023-02-15 RX ORDER — METHYLPHENIDATE HYDROCHLORIDE 27 MG/1
27 TABLET ORAL DAILY
Qty: 30 TABLET | Refills: 0 | Status: CANCELLED | OUTPATIENT
Start: 2023-02-15 | End: 2023-03-17

## 2023-02-15 RX ORDER — METHYLPHENIDATE HYDROCHLORIDE 27 MG/1
27 TABLET ORAL DAILY
Qty: 30 TABLET | Refills: 0 | Status: SHIPPED | OUTPATIENT
Start: 2023-03-18 | End: 2023-02-20

## 2023-02-15 RX ORDER — METHYLPHENIDATE HYDROCHLORIDE 27 MG/1
27 TABLET ORAL DAILY
Qty: 30 TABLET | Refills: 0 | Status: SHIPPED | OUTPATIENT
Start: 2023-04-18 | End: 2023-05-18

## 2023-02-15 RX ORDER — METHYLPHENIDATE HYDROCHLORIDE 27 MG/1
27 TABLET ORAL DAILY
Qty: 30 TABLET | Refills: 0 | Status: SHIPPED | OUTPATIENT
Start: 2023-02-15 | End: 2023-02-20 | Stop reason: SDUPTHER

## 2023-02-20 ENCOUNTER — PATIENT MESSAGE (OUTPATIENT)
Dept: INTERNAL MEDICINE CLINIC | Facility: CLINIC | Age: 28
End: 2023-02-20

## 2023-02-20 RX ORDER — METHYLPHENIDATE HYDROCHLORIDE 27 MG/1
27 TABLET ORAL EVERY MORNING
Qty: 30 TABLET | Refills: 0 | Status: SHIPPED | OUTPATIENT
Start: 2023-02-20 | End: 2023-03-22

## 2023-02-20 RX ORDER — METHYLPHENIDATE HYDROCHLORIDE 27 MG/1
27 TABLET ORAL EVERY MORNING
Qty: 30 TABLET | Refills: 0
Start: 2023-02-20 | End: 2023-03-22

## 2023-02-20 NOTE — TELEPHONE ENCOUNTER
To Dr. Von Vinson:  Order pended for pt's new pharmacy. Offered \"Print Script\" offer; pt declined.

## 2023-02-20 NOTE — TELEPHONE ENCOUNTER
To MD:  The above refill request is for a controlled substance. Please review pended medication order. Print and sign for staff to fax to pharmacy or prescribe electronically. Last office visit: 2/15/23  Last time refill sent and quantity/refills: Per [place]  last dispensed 1/16/23, #30/0    Order pended for E-script to the requested pharmacy; please let nursing know if you'd prefer to print script and have pt . thanks!

## 2023-02-24 ENCOUNTER — PATIENT MESSAGE (OUTPATIENT)
Dept: INTERNAL MEDICINE CLINIC | Facility: CLINIC | Age: 28
End: 2023-02-24

## 2023-03-21 ENCOUNTER — HOSPITAL ENCOUNTER (OUTPATIENT)
Dept: ULTRASOUND IMAGING | Facility: HOSPITAL | Age: 28
Discharge: HOME | End: 2023-03-21
Attending: INTERNAL MEDICINE
Payer: COMMERCIAL

## 2023-03-21 DIAGNOSIS — R19.00 PELVIC MASS: ICD-10-CM

## 2023-03-21 PROCEDURE — 76830 TRANSVAGINAL US NON-OB: CPT | Performed by: INTERNAL MEDICINE

## 2023-03-21 PROCEDURE — 76856 US EXAM PELVIC COMPLETE: CPT | Performed by: INTERNAL MEDICINE

## 2023-04-03 ENCOUNTER — PATIENT MESSAGE (OUTPATIENT)
Dept: INTERNAL MEDICINE CLINIC | Facility: CLINIC | Age: 28
End: 2023-04-03

## 2023-04-04 RX ORDER — NORGESTIMATE AND ETHINYL ESTRADIOL 0.25-0.035
1 KIT ORAL DAILY
Qty: 84 TABLET | Refills: 4 | Status: SHIPPED | OUTPATIENT
Start: 2023-04-04 | End: 2024-04-03

## 2023-04-04 RX ORDER — METHYLPHENIDATE HYDROCHLORIDE 27 MG/1
27 TABLET ORAL EVERY MORNING
Qty: 30 TABLET | Refills: 0 | Status: CANCELLED
Start: 2023-04-04

## 2023-04-05 RX ORDER — METHYLPHENIDATE HYDROCHLORIDE 27 MG/1
27 TABLET ORAL DAILY
Qty: 30 TABLET | Refills: 0 | Status: SHIPPED | OUTPATIENT
Start: 2023-05-06 | End: 2023-04-05

## 2023-04-05 RX ORDER — METHYLPHENIDATE HYDROCHLORIDE 27 MG/1
27 TABLET ORAL DAILY
Qty: 30 TABLET | Refills: 0 | Status: SHIPPED | OUTPATIENT
Start: 2023-04-05 | End: 2023-04-05

## 2023-04-05 RX ORDER — METHYLPHENIDATE HYDROCHLORIDE 27 MG/1
27 TABLET ORAL DAILY
Qty: 30 TABLET | Refills: 0 | Status: SHIPPED | OUTPATIENT
Start: 2023-05-06 | End: 2023-06-05

## 2023-04-05 RX ORDER — METHYLPHENIDATE HYDROCHLORIDE 27 MG/1
27 TABLET ORAL DAILY
Qty: 30 TABLET | Refills: 0 | Status: SHIPPED | OUTPATIENT
Start: 2023-04-05 | End: 2023-05-05

## 2023-04-05 RX ORDER — METHYLPHENIDATE HYDROCHLORIDE 27 MG/1
27 TABLET ORAL DAILY
Qty: 30 TABLET | Refills: 0 | Status: SHIPPED | OUTPATIENT
Start: 2023-06-06 | End: 2023-04-05

## 2023-04-05 RX ORDER — METHYLPHENIDATE HYDROCHLORIDE 27 MG/1
27 TABLET ORAL DAILY
Qty: 30 TABLET | Refills: 0 | Status: SHIPPED | OUTPATIENT
Start: 2023-06-06 | End: 2023-07-06

## 2023-04-05 NOTE — TELEPHONE ENCOUNTER
Called [facility] and cancelled script for Concerta. New 90 day panel pended-Pelon Walker (Division [address]).     To Dr. Denise Lewis: please sign off on control

## 2023-06-06 ENCOUNTER — PATIENT MESSAGE (OUTPATIENT)
Dept: INTERNAL MEDICINE CLINIC | Facility: CLINIC | Age: 28
End: 2023-06-06

## 2023-06-06 RX ORDER — METHYLPHENIDATE HYDROCHLORIDE 27 MG/1
27 TABLET ORAL DAILY
Qty: 30 TABLET | Refills: 0 | OUTPATIENT
Start: 2023-06-06 | End: 2023-07-06

## 2023-06-28 RX ORDER — NORGESTIMATE AND ETHINYL ESTRADIOL 0.25-0.035
1 KIT ORAL DAILY
Qty: 84 TABLET | Refills: 4 | OUTPATIENT
Start: 2023-06-28 | End: 2024-06-27

## 2023-08-01 ENCOUNTER — TELEPHONE (OUTPATIENT)
Dept: INTERNAL MEDICINE CLINIC | Facility: CLINIC | Age: 28
End: 2023-08-01

## 2023-08-01 ENCOUNTER — PATIENT MESSAGE (OUTPATIENT)
Dept: INTERNAL MEDICINE CLINIC | Facility: CLINIC | Age: 28
End: 2023-08-01

## 2023-08-01 RX ORDER — METHYLPHENIDATE HYDROCHLORIDE 27 MG/1
27 TABLET ORAL EVERY MORNING
Qty: 30 TABLET | Refills: 0 | Status: CANCELLED | OUTPATIENT
Start: 2023-08-01

## 2023-08-01 NOTE — TELEPHONE ENCOUNTER
From: [facility]  To: Yuriy Fernando DO  Sent: 8/1/2023 8:56 AM CDT  Subject: Concerta Refill    Hi Dr. Jonathan East,     Could you please send a Concerta refill to the [facility] on [address] in [facility] for me? They seem to be on back order everywhere but if I could at least get it requested for them to order that would be great, thank you.     Jen Flores

## 2023-08-01 NOTE — TELEPHONE ENCOUNTER
----- [facility] from [facility]. Trish Addison sent at 8/1/2023  8:56 AM CDT -----  Regarding: Concerta Refill  Contact: [phone]  Hi Dr. Heaven Castillo,     Could you please send a Concerta refill to the [facility] on [address] in [facility] for me? They seem to be on back order everywhere but if I could at least get it requested for them to order that would be great, thank you.     Yen Velez

## 2023-08-01 NOTE — TELEPHONE ENCOUNTER
To MD:  The above refill request is for a controlled substance. Please review pended medication order. Print and sign for staff to fax to pharmacy or prescribe electronically. Last office visit:2/15/23  Last time refill sent and quantity/refills:6/623 # 30  To DR. Emily Soliz

## 2023-08-02 RX ORDER — METHYLPHENIDATE HYDROCHLORIDE 27 MG/1
27 TABLET ORAL DAILY
Qty: 30 TABLET | Refills: 0 | Status: SHIPPED | OUTPATIENT
Start: 2023-08-02 | End: 2023-09-01

## 2023-08-02 RX ORDER — METHYLPHENIDATE HYDROCHLORIDE 27 MG/1
27 TABLET ORAL DAILY
Qty: 30 TABLET | Refills: 0 | Status: SHIPPED | OUTPATIENT
Start: 2023-10-03 | End: 2023-11-02

## 2023-08-02 RX ORDER — METHYLPHENIDATE HYDROCHLORIDE 27 MG/1
27 TABLET ORAL DAILY
Qty: 30 TABLET | Refills: 0 | Status: SHIPPED | OUTPATIENT
Start: 2023-09-02 | End: 2023-10-02

## 2023-08-07 NOTE — TELEPHONE ENCOUNTER
Fax received from [address] [place] [address] needed for Methylphenidate HCI ER 27 mg  Call [phone]  Patient ID # [id number]    Placed in purple folder

## 2023-08-07 NOTE — TELEPHONE ENCOUNTER
To Colleen Velarde  Please assist    Fax received from [address] [facility] needed for Methylphenidate HCI ER 27 mg  Call [phone]  Patient ID # [id number]     Placed in purple folder

## 2023-12-04 ENCOUNTER — PATIENT MESSAGE (OUTPATIENT)
Dept: INTERNAL MEDICINE CLINIC | Facility: CLINIC | Age: 28
End: 2023-12-04

## 2023-12-04 NOTE — TELEPHONE ENCOUNTER
To Dr Payton Salmon. To MD:  The above refill request is for a controlled substance. Please review pended medication order. Print and sign for staff to fax to pharmacy or prescribe electronically.     Last office visit: 2/15/23  Last time refill sent and quantity/refills:    Methylphenidate HCl     Dispensed Written Strength Quantity Refills Days Supply Provider Pharmacy   METHYLPHENIDATE HCL 10/26/2023 08/02/2023 27 mg 30  30 TIMMY MARIE S (DO) [id number]   METHYLPHENIDATE HCL 09/18/2023 08/02/2023 27 mg 30  30 CYNTHIATIMMY SIMPSON S (DO) [id number]   METHYLPHENIDATE HCL 08/10/2023 08/02/2023 27 mg 30  30 CYNTHIATIMMY SIMPSON S (DO) [id number]   METHYLPHENIDATE HCL 06/22/2023 04/05/2023 27 mg 30  30 CYNTHIATIMMY SIMPSON S (DO) [id number]

## 2023-12-04 NOTE — TELEPHONE ENCOUNTER
From: Payal Castillo  To: Paul Badillo  Sent: 12/4/2023 2:12 PM CST  Subject: Concerta Refill    Hi Dr Keren Ramirez,    Could you please send my refill for my Concerta to the [facility] [place] on [address] in [facility] we have been sending it to? Thank you!     Mindi Cronin

## 2023-12-05 RX ORDER — METHYLPHENIDATE HYDROCHLORIDE 27 MG/1
27 TABLET ORAL DAILY
Qty: 30 TABLET | Refills: 0 | Status: SHIPPED | OUTPATIENT
Start: 2024-01-04 | End: 2024-02-03

## 2023-12-05 RX ORDER — METHYLPHENIDATE HYDROCHLORIDE 27 MG/1
27 TABLET ORAL DAILY
Qty: 30 TABLET | Refills: 0 | Status: SHIPPED | OUTPATIENT
Start: 2024-02-04 | End: 2024-03-05

## 2023-12-05 RX ORDER — METHYLPHENIDATE HYDROCHLORIDE 27 MG/1
27 TABLET ORAL DAILY
Qty: 30 TABLET | Refills: 0 | Status: SHIPPED | OUTPATIENT
Start: 2023-12-05 | End: 2024-01-04

## 2024-02-12 ENCOUNTER — OFFICE VISIT (OUTPATIENT)
Dept: INTERNAL MEDICINE CLINIC | Facility: CLINIC | Age: 29
End: 2024-02-12

## 2024-02-12 VITALS
TEMPERATURE: 98 F | DIASTOLIC BLOOD PRESSURE: 78 MMHG | WEIGHT: 131.81 LBS | HEART RATE: 108 BPM | SYSTOLIC BLOOD PRESSURE: 125 MMHG | BODY MASS INDEX: 24.89 KG/M2 | HEIGHT: 61 IN | OXYGEN SATURATION: 99 %

## 2024-02-12 DIAGNOSIS — Z12.4 SCREENING FOR CERVICAL CANCER: ICD-10-CM

## 2024-02-12 DIAGNOSIS — Z00.00 ANNUAL PHYSICAL EXAM: Primary | ICD-10-CM

## 2024-02-12 PROCEDURE — 3078F DIAST BP <80 MM HG: CPT | Performed by: INTERNAL MEDICINE

## 2024-02-12 PROCEDURE — 3008F BODY MASS INDEX DOCD: CPT | Performed by: INTERNAL MEDICINE

## 2024-02-12 PROCEDURE — 3074F SYST BP LT 130 MM HG: CPT | Performed by: INTERNAL MEDICINE

## 2024-02-12 PROCEDURE — 99395 PREV VISIT EST AGE 18-39: CPT | Performed by: INTERNAL MEDICINE

## 2024-02-12 RX ORDER — NORGESTIMATE AND ETHINYL ESTRADIOL 0.25-0.035
1 KIT ORAL DAILY
Qty: 84 TABLET | Refills: 4 | Status: SHIPPED | OUTPATIENT
Start: 2024-02-12 | End: 2025-02-11

## 2024-02-12 NOTE — PROGRESS NOTES
Shruti Erickson is a 28 year old female.  Chief Complaint   Patient presents with    Physical     Last Pap 1/21/21       HPI:   Shruti Erickson is a 28 year old female who presents for a complete physical exam.      PMH: anxiety, ADHD  Feels stable on medication  Positive chlamydia 1/2018  She denies any surgical history.     She denies any allergies to medications. She does drink occasionally, but denies any illicit drug use or smoking.   Her family history is updated and listed below.   She is sexually active.  Works as an     Took plan b in November. Having regular periods, a little shorter lately.   Feels more bloated. Has not been good with her diet and exercise  Feels a pulsating sensation in L pelvic area when laying left side        Wt Readings from Last 6 Encounters:   02/12/24 131 lb 12.8 oz (59.8 kg)   02/15/23 130 lb (59 kg)   10/17/22 123 lb (55.8 kg)   02/03/22 113 lb (51.3 kg)   11/16/21 117 lb (53.1 kg)   10/26/21 117 lb (53.1 kg)     Body mass index is 24.9 kg/m².       Current Outpatient Medications   Medication Sig Dispense Refill    methylphenidate ER (CONCERTA) 27 MG Oral Tab CR Take 1 tablet (27 mg total) by mouth daily. 30 tablet 0    Norgestimate-Eth Estradiol (SPRINTEC 28) 0.25-35 MG-MCG Oral Tab Take 1 tablet by mouth daily. 84 tablet 4    ALPRAZolam 0.25 MG Oral Tab Take 1 tablet (0.25 mg total) by mouth every 6 (six) hours as needed. 30 tablet 1    Cholecalciferol (VITAMIN D) 50 MCG (2000 UT) Oral Tab Take 2,000 Units by mouth daily. 1 tablet 0    fexofenadine 180 MG Oral Tab Take 1 tablet (180 mg total) by mouth daily. (Patient not taking: Reported on 2/15/2023) 30 tablet 3      Past Medical History:   Diagnosis Date    Anxiety     medication    Chlamydia 2015    treated at school per pt.      History reviewed. No pertinent surgical history.   Family History   Problem Relation Age of Onset    Lipids Mother         hyperlipidemia    Diabetes Maternal Grandfather      Diabetes Paternal Grandfather     High Cholesterol Paternal Grandfather         elevated cholesterol    High Cholesterol Maternal Grandfather         elevated cholesterol    Cancer Paternal Grandmother         ovarian cancer?    Heart Attack Neg         Sudden death/MI under age 55, paternal grandfather, maternal grandfather    Glaucoma Neg       Social History:   Social History     Socioeconomic History    Marital status: Single   Tobacco Use    Smoking status: Never    Smokeless tobacco: Never   Vaping Use    Vaping Use: Every day    Substances: Nicotine    Devices: Disposable   Substance and Sexual Activity    Alcohol use: Yes     Alcohol/week: 0.0 standard drinks of alcohol     Comment: occasionally    Drug use: Not Currently     Types: Cannabis     Comment: former cannibas    Sexual activity: Yes     Partners: Male     Birth control/protection: OCP, Condom     Comment: Last active in October   Other Topics Concern    Reaction to local anesthetic No          REVIEW OF SYSTEMS:   GENERAL: feels well otherwise  SKIN: denies any unusual skin lesions  EYES:denies blurred vision or double vision  HEENT: denies nasal congestion, sinus pain, ST, sore throat  LUNGS: denies shortness of breath with exertion, cough or wheezing  BREAST: denies masses or nipple discharge  CARDIOVASCULAR: denies chest pain, pressure, or palpitations  GI: denies abdominal pain, nausea, vomiting, diarrhea, constipation  : denies dysuria, urinary frequency, vaginal discharge, dyspareunia, heavy menses  NEURO: denies headaches, dizziness, focal deficits  PSYCHE: denies anxiety or depression  EXT: denies edema      EXAM:   /78   Pulse 108   Temp 98.4 °F (36.9 °C) (Tympanic)   Ht 5' 1\" (1.549 m)   Wt 131 lb 12.8 oz (59.8 kg)   LMP 02/07/2024 (Approximate)   SpO2 99%   BMI 24.90 kg/m²     GENERAL: well developed, well nourished, in no apparent distress  HEENT: normal oropharynx, normal TM's  EYES: PERRLA, EOMI, conjunctivae are  pink  NECK: supple, no cervical or supraclavicular LAD, no carotic bruits, no thyromegaly  BREAST: no dominant or suspicious mass, no axillary LAD  LUNGS: clear to auscultation  CARDIO: RRR, normal S1S2, no gallops or murmurs  GI: soft, NT, ND, NABS, no HSM. No palpable mass  GYNE: deferred  EXTREMITIES: no cyanosis, clubbing or edema, +2 DP pulses bilaterally        ASSESSMENT AND PLAN:     Anxiety  No longer taking wellbutrin; doing well  Has not needed xanax    Annual physical  Pap due 2024  Vaccines UTD  Encouraged exercise    ADHD  Did not do well with ritalin  Stable on Concerta; RF given    OCP surveillance  Refilled ocp      RTC in 1 yr

## 2024-02-15 LAB
ABSOLUTE BASOPHILS: 111 CELLS/UL (ref 0–200)
ABSOLUTE EOSINOPHILS: 182 CELLS/UL (ref 15–500)
ABSOLUTE LYMPHOCYTES: 3293 CELLS/UL (ref 850–3900)
ABSOLUTE MONOCYTES: 667 CELLS/UL (ref 200–950)
ABSOLUTE NEUTROPHILS: 5848 CELLS/UL (ref 1500–7800)
ALBUMIN/GLOBULIN RATIO: 1.5 (CALC) (ref 1–2.5)
ALBUMIN: 4.3 G/DL (ref 3.6–5.1)
ALKALINE PHOSPHATASE: 53 U/L (ref 31–125)
ALT: 13 U/L (ref 6–29)
AST: 18 U/L (ref 10–30)
BASOPHILS: 1.1 %
BILIRUBIN, TOTAL: 0.6 MG/DL (ref 0.2–1.2)
BUN: 16 MG/DL (ref 7–25)
CALCIUM: 9.5 MG/DL (ref 8.6–10.2)
CARBON DIOXIDE: 26 MMOL/L (ref 20–32)
CHLAMYDIA TRACHOMATIS$RNA, TMA: NOT DETECTED
CHLORIDE: 102 MMOL/L (ref 98–110)
CHOL/HDLC RATIO: 2.9 (CALC)
CHOLESTEROL, TOTAL: 191 MG/DL
CREATININE: 0.77 MG/DL (ref 0.5–0.96)
EGFR: 108 ML/MIN/1.73M2
EOSINOPHILS: 1.8 %
GLOBULIN: 2.8 G/DL (CALC) (ref 1.9–3.7)
GLUCOSE: 84 MG/DL (ref 65–99)
HDL CHOLESTEROL: 67 MG/DL
HEMATOCRIT: 44.6 % (ref 35–45)
HEMOGLOBIN: 14.3 G/DL (ref 11.7–15.5)
LDL-CHOLESTEROL: 106 MG/DL (CALC)
LYMPHOCYTES: 32.6 %
MCH: 29.5 PG (ref 27–33)
MCHC: 32.1 G/DL (ref 32–36)
MCV: 92.1 FL (ref 80–100)
MONOCYTES: 6.6 %
MPV: 11.3 FL (ref 7.5–12.5)
NEISSERIA GONORRHOEAE$RNA, TMA: NOT DETECTED
NEUTROPHILS: 57.9 %
NON-HDL CHOLESTEROL: 124 MG/DL (CALC)
PLATELET COUNT: 288 THOUSAND/UL (ref 140–400)
POTASSIUM: 4.1 MMOL/L (ref 3.5–5.3)
PROTEIN, TOTAL: 7.1 G/DL (ref 6.1–8.1)
RDW: 12.8 % (ref 11–15)
RED BLOOD CELL COUNT: 4.84 MILLION/UL (ref 3.8–5.1)
SODIUM: 138 MMOL/L (ref 135–146)
TRIGLYCERIDES: 88 MG/DL
TSH W/REFLEX TO FT4: 2.08 MIU/L
WHITE BLOOD CELL COUNT: 10.1 THOUSAND/UL (ref 3.8–10.8)

## 2024-02-21 LAB — HPV I/H RISK 1 DNA SPEC QL NAA+PROBE: NEGATIVE

## 2024-02-22 LAB — LAST PAP RESULT: NORMAL

## 2024-08-16 ENCOUNTER — TELEPHONE (OUTPATIENT)
Dept: INTERNAL MEDICINE CLINIC | Facility: CLINIC | Age: 29
End: 2024-08-16

## 2024-08-16 ENCOUNTER — PATIENT MESSAGE (OUTPATIENT)
Dept: INTERNAL MEDICINE CLINIC | Facility: CLINIC | Age: 29
End: 2024-08-16

## 2024-08-16 NOTE — TELEPHONE ENCOUNTER
----- Message from [facility]  sent at 8/16/2024 11:40 AM CDT -----  Regarding: Spotting before period  Contact: [phone]  Hi Dr Chang,    I have been getting light spotting right before I start my period for the past couple of months. Not sure if this is something I should be concerned about or not. I took a pregnancy test last month and it said negative, the last time I had sex was in June. It’s a light pink/sometimes red and only happens when I wipe. Let me know if this is something I should be concerned about. Give me a call if needed.     Thank you, Susana

## 2024-08-16 NOTE — TELEPHONE ENCOUNTER
From: Shruti Erickson  To: Luzmaria Chang  Sent: 8/16/2024 11:40 AM CDT  Subject: Spotting before period    Hi Dr Chang,    I have been getting light spotting right before I start my period for the past couple of months. Not sure if this is something I should be concerned about or not. I took a pregnancy test last month and it said negative, the last time I had sex was in June. It’s a light pink/sometimes red and only happens when I wipe. Let me know if this is something I should be concerned about. Give me a call if needed.     Thank you, Susana

## 2025-02-18 ENCOUNTER — OFFICE VISIT (OUTPATIENT)
Dept: INTERNAL MEDICINE CLINIC | Facility: CLINIC | Age: 30
End: 2025-02-18
Payer: COMMERCIAL

## 2025-02-18 VITALS
WEIGHT: 131 LBS | RESPIRATION RATE: 16 BRPM | BODY MASS INDEX: 24.73 KG/M2 | TEMPERATURE: 98 F | DIASTOLIC BLOOD PRESSURE: 68 MMHG | OXYGEN SATURATION: 98 % | HEART RATE: 108 BPM | SYSTOLIC BLOOD PRESSURE: 110 MMHG | HEIGHT: 61 IN

## 2025-02-18 DIAGNOSIS — F90.0 ATTENTION DEFICIT HYPERACTIVITY DISORDER (ADHD), PREDOMINANTLY INATTENTIVE TYPE: ICD-10-CM

## 2025-02-18 DIAGNOSIS — Z00.00 ANNUAL PHYSICAL EXAM: Primary | ICD-10-CM

## 2025-02-18 DIAGNOSIS — Z11.3 SCREENING EXAMINATION FOR STD (SEXUALLY TRANSMITTED DISEASE): ICD-10-CM

## 2025-02-18 RX ORDER — TRIAMCINOLONE ACETONIDE 1 MG/G
CREAM TOPICAL 2 TIMES DAILY
COMMUNITY
Start: 2025-01-10

## 2025-02-18 RX ORDER — METHYLPHENIDATE HYDROCHLORIDE 27 MG/1
27 TABLET ORAL EVERY MORNING
Qty: 30 TABLET | Refills: 0 | Status: SHIPPED | OUTPATIENT
Start: 2025-03-18

## 2025-02-18 RX ORDER — METHYLPHENIDATE HYDROCHLORIDE 27 MG/1
27 TABLET ORAL EVERY MORNING
Qty: 30 TABLET | Refills: 0 | Status: SHIPPED | OUTPATIENT
Start: 2025-02-18 | End: 2025-03-20

## 2025-02-18 RX ORDER — METHYLPHENIDATE HYDROCHLORIDE 27 MG/1
27 TABLET ORAL EVERY MORNING
COMMUNITY
End: 2025-02-18

## 2025-02-18 NOTE — PROGRESS NOTES
Shruti Erickson is a 29 year old female.  Chief Complaint   Patient presents with    Physical     ANNUAL PHYSICAL  Stopped taking BCP in December  Pap Smear 2/12/24       HPI:   Shruti Erickson is a 29 year old female who presents for a complete physical exam.      PMH: anxiety, ADHD  Feels stable on medication  Positive chlamydia 1/2018  She denies any surgical history.     She denies any allergies to medications. She does drink occasionally, but denies any illicit drug use or smoking.   Her family history is updated and listed below.   She is sexually active. Not currently active  Works as an       -stopped ocp last December-periods are not quite regular yet  -overall feeling well      Wt Readings from Last 6 Encounters:   02/18/25 131 lb (59.4 kg)   02/12/24 131 lb 12.8 oz (59.8 kg)   02/15/23 130 lb (59 kg)   10/17/22 123 lb (55.8 kg)   02/03/22 113 lb (51.3 kg)   11/16/21 117 lb (53.1 kg)     Body mass index is 24.75 kg/m².       Current Outpatient Medications   Medication Sig Dispense Refill    triamcinolone 0.1 % External Cream Apply topically 2 (two) times daily.      methylphenidate ER 27 MG Oral Tab CR Take 1 tablet (27 mg total) by mouth every morning.      ALPRAZolam 0.25 MG Oral Tab Take 1 tablet (0.25 mg total) by mouth every 6 (six) hours as needed. 30 tablet 1    Cholecalciferol (VITAMIN D) 50 MCG (2000 UT) Oral Tab Take 2,000 Units by mouth daily. 1 tablet 0    Norgestimate-Eth Estradiol (SPRINTEC 28) 0.25-35 MG-MCG Oral Tab Take 1 tablet by mouth daily. 84 tablet 4    fexofenadine 180 MG Oral Tab Take 1 tablet (180 mg total) by mouth daily. (Patient not taking: Reported on 2/15/2023) 30 tablet 3      Past Medical History:    Anxiety    medication    Chlamydia    treated at school per pt.      History reviewed. No pertinent surgical history.   Family History   Problem Relation Age of Onset    Lipids Mother         hyperlipidemia    Diabetes Maternal Grandfather     Diabetes  Paternal Grandfather     High Cholesterol Paternal Grandfather         elevated cholesterol    High Cholesterol Maternal Grandfather         elevated cholesterol    Cancer Paternal Grandmother         ovarian cancer?    Heart Attack Neg         Sudden death/MI under age 55, paternal grandfather, maternal grandfather    Glaucoma Neg       Social History:   Social History     Socioeconomic History    Marital status: Single   Tobacco Use    Smoking status: Never    Smokeless tobacco: Never   Vaping Use    Vaping status: Every Day    Substances: Nicotine    Devices: Disposable   Substance and Sexual Activity    Alcohol use: Yes     Alcohol/week: 0.0 standard drinks of alcohol     Comment: occasionally    Drug use: Not Currently     Types: Cannabis     Comment: former cannibas    Sexual activity: Yes     Partners: Male     Birth control/protection: OCP, Condom     Comment: Last active in October   Other Topics Concern    Reaction to local anesthetic No          REVIEW OF SYSTEMS:   GENERAL: feels well otherwise  SKIN: denies any unusual skin lesions  EYES:denies blurred vision or double vision  HEENT: denies nasal congestion, sinus pain, ST, sore throat  LUNGS: denies shortness of breath with exertion, cough or wheezing  BREAST: denies masses or nipple discharge  CARDIOVASCULAR: denies chest pain, pressure, or palpitations  GI: denies abdominal pain, nausea, vomiting, diarrhea, constipation  : denies dysuria, urinary frequency, vaginal discharge, dyspareunia, heavy menses  NEURO: denies headaches, dizziness, focal deficits  PSYCHE: denies anxiety or depression  EXT: denies edema      EXAM:   /68   Pulse 108   Temp 98.2 °F (36.8 °C) (Oral)   Resp 16   Ht 5' 1\" (1.549 m)   Wt 131 lb (59.4 kg)   LMP 01/08/2025   SpO2 98%   BMI 24.75 kg/m²     GENERAL: well developed, well nourished, in no apparent distress  HEENT: normal oropharynx, normal TM's  EYES: PERRLA, EOMI, conjunctivae are pink  NECK: supple, no  cervical or supraclavicular LAD, no carotic bruits, no thyromegaly  BREAST: no dominant or suspicious mass, no axillary LAD  LUNGS: clear to auscultation  CARDIO: RRR, normal S1S2, no gallops or murmurs  GI: soft, NT, ND, NABS, no HSM. No palpable mass  GYNE: deferred  EXTREMITIES: no cyanosis, clubbing or edema, +2 DP pulses bilaterally        ASSESSMENT AND PLAN:     Anxiety  No longer taking meds  Has not needed xanax    Annual physical  Pap done 2024  Vaccines UTD  Encouraged exercise  Std screening labs ordered    ADHD  Did not do well with ritalin  Stable on Concerta; RF given    OCP surveillance  Stopped ocp 12/2024        RTC in 1 yr      Luzmaria Chang DO, 02/18/25, 8:12 AM

## 2025-02-22 LAB
CHLAMYDIA TRACHOMATIS$RNA, TMA: NOT DETECTED
NEISSERIA GONORRHOEAE$RNA, TMA: NOT DETECTED
T. PALLIDUM AB, EIA: NEGATIVE

## 2025-03-18 ENCOUNTER — PATIENT MESSAGE (OUTPATIENT)
Dept: INTERNAL MEDICINE CLINIC | Facility: CLINIC | Age: 30
End: 2025-03-18

## 2025-03-31 ENCOUNTER — TELEPHONE (OUTPATIENT)
Dept: INTERNAL MEDICINE CLINIC | Facility: CLINIC | Age: 30
End: 2025-03-31

## 2025-03-31 DIAGNOSIS — F41.9 ANXIETY: ICD-10-CM

## 2025-03-31 RX ORDER — ALPRAZOLAM 0.25 MG
0.25 TABLET ORAL EVERY 6 HOURS PRN
Qty: 30 TABLET | Refills: 1 | Status: SHIPPED | OUTPATIENT
Start: 2025-03-31

## 2025-03-31 NOTE — TELEPHONE ENCOUNTER
Dr. TRIPP-    The above refill request is for a controlled substance.  Please review pended medication order.  LOV: 2/25  Prescribed: 30 with 1 2/15/24  : n/a

## 2025-03-31 NOTE — TELEPHONE ENCOUNTER
Patient is calling to check on the status of the refill    Alprazolam    Patient is low on the medication and will be leaving town on Wednesday 4/2.  Patient is requesting the refill be called in before then.

## (undated) NOTE — LETTER
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   This is a benign (not cancerous) growth, and requires no fur

## (undated) NOTE — MR AVS SNAPSHOT
After Visit Summary   1/21/2021    Ricardo Singh    MRN: [id number]           Visit Information     Date & Time  1/21/2021  5:30 PM Provider  Chula Gutiérrez MD [address] Dept[address]  Phone  [phone] LIPID PANEL [[id number] CUSTOM]     T PALLIDUM SCREENING CASCADE [0450410 CPT(R)]     THINPREP PAP WITH HPV REFLEX REQUEST B [UTY4284 CUSTOM]     THINPREP PAP WITH HPV REFLEX REQUEST [[id number] CUSTOM]     VITAMIN D, 25-HYDROXY [5056147 CUSTOM]     Future Labs Treatment for mild illness or injury that does not require immediate attention.  Average cost  $70*   [facility]  Monday – Friday  8:00 am – 8:00 pm   Saturday – Sunday  8:00 am – 4:00 pm    WALK-IN CARE  Primary Care

## (undated) NOTE — MR AVS SNAPSHOT
[address]  [address]  [phone]               Thank you for choosing us for your health care visit with [facility], .   We are glad to serve you and happy to provide you with this summary of